# Patient Record
Sex: FEMALE | Race: WHITE | Employment: OTHER | URBAN - METROPOLITAN AREA
[De-identification: names, ages, dates, MRNs, and addresses within clinical notes are randomized per-mention and may not be internally consistent; named-entity substitution may affect disease eponyms.]

---

## 2017-06-16 ENCOUNTER — HOSPITAL ENCOUNTER (OUTPATIENT)
Dept: ENDOSCOPY | Age: 82
Discharge: HOME | End: 2017-06-16
Attending: INTERNAL MEDICINE | Admitting: INTERNAL MEDICINE

## 2017-09-14 ENCOUNTER — HOSPITAL ENCOUNTER (OUTPATIENT)
Dept: ENDOSCOPY | Age: 82
Discharge: HOME | End: 2017-09-14
Attending: INTERNAL MEDICINE | Admitting: INTERNAL MEDICINE

## 2017-09-14 RX ORDER — SODIUM CHLORIDE 9 MG/ML
INJECTION, SOLUTION INTRAVENOUS CONTINUOUS
Status: DISCONTINUED | OUTPATIENT
Start: 2017-09-14 | End: 2017-09-15 | Stop reason: HOSPADM

## 2017-09-14 RX ORDER — SODIUM CHLORIDE 0.9 % (FLUSH) 0.9 %
10 SYRINGE (ML) INJECTION EVERY 12 HOURS SCHEDULED
Status: DISCONTINUED | OUTPATIENT
Start: 2017-09-14 | End: 2017-09-15 | Stop reason: HOSPADM

## 2017-09-14 RX ORDER — SODIUM CHLORIDE 0.9 % (FLUSH) 0.9 %
10 SYRINGE (ML) INJECTION PRN
Status: DISCONTINUED | OUTPATIENT
Start: 2017-09-14 | End: 2017-09-15 | Stop reason: HOSPADM

## 2017-09-14 ASSESSMENT — ENCOUNTER SYMPTOMS: SHORTNESS OF BREATH: 0

## 2019-10-07 ENCOUNTER — HOSPITAL ENCOUNTER (OUTPATIENT)
Dept: GENERAL RADIOLOGY | Age: 84
Discharge: HOME | End: 2019-10-07
Payer: MEDICARE

## 2019-10-07 DIAGNOSIS — R13.10 DYSPHAGIA, UNSPECIFIED TYPE: ICD-10-CM

## 2019-10-07 PROCEDURE — 74240 X-RAY XM UPR GI TRC 1CNTRST: CPT

## 2019-11-13 RX ORDER — GABAPENTIN 400 MG/1
400 CAPSULE ORAL 3 TIMES DAILY
COMMUNITY
End: 2021-08-11

## 2019-11-13 RX ORDER — PRAVASTATIN SODIUM 10 MG
10 TABLET ORAL DAILY
COMMUNITY

## 2019-11-13 RX ORDER — FLECAINIDE ACETATE 50 MG/1
50 TABLET ORAL 2 TIMES DAILY
COMMUNITY

## 2019-11-13 RX ORDER — OXYBUTYNIN CHLORIDE 5 MG/1
5 TABLET ORAL 2 TIMES DAILY
COMMUNITY
End: 2021-08-11

## 2019-11-14 ENCOUNTER — ANESTHESIA EVENT (OUTPATIENT)
Dept: ENDOSCOPY | Age: 84
End: 2019-11-14
Payer: MEDICARE

## 2019-11-22 ENCOUNTER — HOSPITAL ENCOUNTER (OUTPATIENT)
Age: 84
Setting detail: OUTPATIENT SURGERY
Discharge: HOME | End: 2019-11-22
Attending: INTERNAL MEDICINE | Admitting: INTERNAL MEDICINE
Payer: MEDICARE

## 2019-11-22 ENCOUNTER — ANESTHESIA (OUTPATIENT)
Dept: ENDOSCOPY | Age: 84
End: 2019-11-22
Payer: MEDICARE

## 2019-11-22 VITALS
BODY MASS INDEX: 30.21 KG/M2 | TEMPERATURE: 97.5 F | WEIGHT: 160 LBS | DIASTOLIC BLOOD PRESSURE: 68 MMHG | HEART RATE: 59 BPM | HEIGHT: 61 IN | RESPIRATION RATE: 16 BRPM | SYSTOLIC BLOOD PRESSURE: 184 MMHG | OXYGEN SATURATION: 98 %

## 2019-11-22 VITALS
SYSTOLIC BLOOD PRESSURE: 201 MMHG | RESPIRATION RATE: 13 BRPM | DIASTOLIC BLOOD PRESSURE: 55 MMHG | OXYGEN SATURATION: 99 %

## 2019-11-22 PROCEDURE — 7100000010 HC PHASE II RECOVERY - FIRST 15 MIN: Performed by: INTERNAL MEDICINE

## 2019-11-22 PROCEDURE — 2580000003 HC RX 258: Performed by: NURSE ANESTHETIST, CERTIFIED REGISTERED

## 2019-11-22 PROCEDURE — 88305 TISSUE EXAM BY PATHOLOGIST: CPT

## 2019-11-22 PROCEDURE — 7100000011 HC PHASE II RECOVERY - ADDTL 15 MIN: Performed by: INTERNAL MEDICINE

## 2019-11-22 PROCEDURE — 2709999900 HC NON-CHARGEABLE SUPPLY: Performed by: INTERNAL MEDICINE

## 2019-11-22 PROCEDURE — 3700000000 HC ANESTHESIA ATTENDED CARE: Performed by: INTERNAL MEDICINE

## 2019-11-22 PROCEDURE — 3609012400 HC EGD TRANSORAL BIOPSY SINGLE/MULTIPLE: Performed by: INTERNAL MEDICINE

## 2019-11-22 PROCEDURE — 2500000003 HC RX 250 WO HCPCS: Performed by: NURSE ANESTHETIST, CERTIFIED REGISTERED

## 2019-11-22 PROCEDURE — 2580000003 HC RX 258: Performed by: ANESTHESIOLOGY

## 2019-11-22 PROCEDURE — 6360000002 HC RX W HCPCS: Performed by: NURSE ANESTHETIST, CERTIFIED REGISTERED

## 2019-11-22 RX ORDER — SODIUM CHLORIDE 0.9 % (FLUSH) 0.9 %
10 SYRINGE (ML) INJECTION EVERY 12 HOURS SCHEDULED
Status: DISCONTINUED | OUTPATIENT
Start: 2019-11-22 | End: 2019-11-22 | Stop reason: HOSPADM

## 2019-11-22 RX ORDER — LIDOCAINE HYDROCHLORIDE 20 MG/ML
INJECTION, SOLUTION EPIDURAL; INFILTRATION; INTRACAUDAL; PERINEURAL PRN
Status: DISCONTINUED | OUTPATIENT
Start: 2019-11-22 | End: 2019-11-22 | Stop reason: SDUPTHER

## 2019-11-22 RX ORDER — SODIUM CHLORIDE 9 MG/ML
INJECTION, SOLUTION INTRAVENOUS CONTINUOUS PRN
Status: DISCONTINUED | OUTPATIENT
Start: 2019-11-22 | End: 2019-11-22 | Stop reason: SDUPTHER

## 2019-11-22 RX ORDER — SODIUM CHLORIDE 0.9 % (FLUSH) 0.9 %
10 SYRINGE (ML) INJECTION PRN
Status: DISCONTINUED | OUTPATIENT
Start: 2019-11-22 | End: 2019-11-22 | Stop reason: HOSPADM

## 2019-11-22 RX ORDER — SODIUM CHLORIDE 9 MG/ML
INJECTION, SOLUTION INTRAVENOUS CONTINUOUS
Status: DISCONTINUED | OUTPATIENT
Start: 2019-11-22 | End: 2019-11-22 | Stop reason: HOSPADM

## 2019-11-22 RX ORDER — PROPOFOL 10 MG/ML
INJECTION, EMULSION INTRAVENOUS PRN
Status: DISCONTINUED | OUTPATIENT
Start: 2019-11-22 | End: 2019-11-22 | Stop reason: SDUPTHER

## 2019-11-22 RX ORDER — LIDOCAINE HYDROCHLORIDE 10 MG/ML
1 INJECTION, SOLUTION EPIDURAL; INFILTRATION; INTRACAUDAL; PERINEURAL
Status: DISCONTINUED | OUTPATIENT
Start: 2019-11-22 | End: 2019-11-22 | Stop reason: HOSPADM

## 2019-11-22 RX ADMIN — LIDOCAINE HYDROCHLORIDE 100 MG: 20 INJECTION, SOLUTION EPIDURAL; INFILTRATION; INTRACAUDAL; PERINEURAL at 12:04

## 2019-11-22 RX ADMIN — PROPOFOL 50 MG: 10 INJECTION, EMULSION INTRAVENOUS at 12:04

## 2019-11-22 RX ADMIN — SODIUM CHLORIDE: 9 INJECTION, SOLUTION INTRAVENOUS at 10:44

## 2019-11-22 RX ADMIN — PROPOFOL 20 MG: 10 INJECTION, EMULSION INTRAVENOUS at 12:08

## 2019-11-22 RX ADMIN — SODIUM CHLORIDE: 9 INJECTION, SOLUTION INTRAVENOUS at 11:52

## 2019-11-22 RX ADMIN — PROPOFOL 20 MG: 10 INJECTION, EMULSION INTRAVENOUS at 12:06

## 2019-11-22 RX ADMIN — PROPOFOL 10 MG: 10 INJECTION, EMULSION INTRAVENOUS at 12:10

## 2019-11-22 ASSESSMENT — ENCOUNTER SYMPTOMS: SHORTNESS OF BREATH: 0

## 2019-11-22 ASSESSMENT — PAIN - FUNCTIONAL ASSESSMENT: PAIN_FUNCTIONAL_ASSESSMENT: 0-10

## 2019-11-22 ASSESSMENT — PAIN SCALES - GENERAL
PAINLEVEL_OUTOF10: 0
PAINLEVEL_OUTOF10: 0
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
## 2021-08-10 PROBLEM — R80.9 PROTEINURIA: Status: ACTIVE | Noted: 2020-04-22

## 2021-08-10 PROBLEM — N18.30 CKD (CHRONIC KIDNEY DISEASE) STAGE 3, GFR 30-59 ML/MIN (HCC): Status: ACTIVE | Noted: 2020-01-09

## 2021-08-10 PROBLEM — I44.0 FIRST DEGREE AV BLOCK: Status: ACTIVE | Noted: 2017-05-11

## 2021-08-10 PROBLEM — M79.2 NEUROPATHIC PAIN: Status: ACTIVE | Noted: 2019-10-27

## 2021-08-10 PROBLEM — I70.213 ATHEROSCLER OF NATIVE ARTERY OF BOTH LEGS WITH INTERMIT CLAUDICATION (HCC): Status: ACTIVE | Noted: 2021-08-10

## 2021-08-10 PROBLEM — I48.0 PAROXYSMAL ATRIAL FIBRILLATION (HCC): Status: ACTIVE | Noted: 2017-05-11

## 2021-08-10 PROBLEM — Z79.899 ENCOUNTER FOR MONITORING FLECAINIDE THERAPY: Status: ACTIVE | Noted: 2017-09-29

## 2021-08-10 PROBLEM — I73.9 PERIPHERAL ARTERIAL DISEASE (HCC): Status: ACTIVE | Noted: 2020-04-22

## 2021-08-10 PROBLEM — Z51.81 ENCOUNTER FOR MONITORING FLECAINIDE THERAPY: Status: ACTIVE | Noted: 2017-09-29

## 2021-08-10 PROBLEM — L59.8 RADIATION-INDUCED FIBROSIS OF SOFT TISSUE FROM THERAPEUTIC PROCEDURE: Status: ACTIVE | Noted: 2021-08-10

## 2021-08-10 PROBLEM — D64.9 NORMOCYTIC ANEMIA: Status: ACTIVE | Noted: 2017-05-11

## 2021-08-10 PROBLEM — R10.2 PERINEAL PAIN: Status: ACTIVE | Noted: 2019-10-27

## 2021-08-10 PROBLEM — G56.03 BILATERAL CARPAL TUNNEL SYNDROME: Status: ACTIVE | Noted: 2018-08-30

## 2021-08-10 PROBLEM — E78.5 HYPERLIPIDEMIA: Status: ACTIVE | Noted: 2020-04-22

## 2021-08-10 PROBLEM — T66.XXXS RADIATION EFFECT, SEQUELA: Status: ACTIVE | Noted: 2021-08-10

## 2021-08-10 PROBLEM — N39.41 URGE INCONTINENCE OF URINE: Status: ACTIVE | Noted: 2018-04-23

## 2021-08-10 PROBLEM — G89.4 CHRONIC PAIN DISORDER: Status: ACTIVE | Noted: 2019-10-27

## 2021-08-11 ENCOUNTER — OFFICE VISIT (OUTPATIENT)
Dept: UROGYNECOLOGY | Age: 86
End: 2021-08-11
Payer: MEDICARE

## 2021-08-11 VITALS
OXYGEN SATURATION: 98 % | TEMPERATURE: 98.3 F | HEART RATE: 71 BPM | DIASTOLIC BLOOD PRESSURE: 71 MMHG | RESPIRATION RATE: 14 BRPM | SYSTOLIC BLOOD PRESSURE: 157 MMHG

## 2021-08-11 DIAGNOSIS — C52 SQUAMOUS CELL CARCINOMA OF VAGINA (HCC): ICD-10-CM

## 2021-08-11 DIAGNOSIS — R35.0 URINARY FREQUENCY: ICD-10-CM

## 2021-08-11 DIAGNOSIS — R32 URINARY INCONTINENCE, UNSPECIFIED TYPE: Primary | ICD-10-CM

## 2021-08-11 DIAGNOSIS — N32.81 OAB (OVERACTIVE BLADDER): ICD-10-CM

## 2021-08-11 DIAGNOSIS — N30.40 RADIATION CYSTITIS: ICD-10-CM

## 2021-08-11 PROCEDURE — 95971 ALYS SMPL SP/PN NPGT W/PRGRM: CPT | Performed by: NURSE PRACTITIONER

## 2021-08-11 PROCEDURE — 99214 OFFICE O/P EST MOD 30 MIN: CPT | Performed by: NURSE PRACTITIONER

## 2021-08-11 RX ORDER — LATANOPROST 50 UG/ML
1 SOLUTION/ DROPS OPHTHALMIC NIGHTLY
COMMUNITY

## 2021-08-11 RX ORDER — BENAZEPRIL HYDROCHLORIDE 40 MG/1
40 TABLET, FILM COATED ORAL DAILY
COMMUNITY

## 2021-08-11 RX ORDER — LABETALOL 100 MG/1
100 TABLET, FILM COATED ORAL 2 TIMES DAILY
COMMUNITY

## 2021-08-11 NOTE — PROGRESS NOTES
2021      HPI:     Name: Jesusita Null  YOB: 1934    CC: Patient is a 80 y.o. female who is seen in consultation from Arabella Almanzar MD   for evaluation of voiding dysfunction and interstim adjustment. HPI: Previous patient at [facility] for urinary frequency and urge incontinence. Kaylin Bailon had Interstim placed 2018. She has history of vaginal cancer with radiation 2015  She is not a Botox candidate due to this. She is unable to use vaginal estrogen cream    Previous medications include Myrbetriq, Vesicare, tolterodine and oxybutynin. None with excellent effectiveness but recalls Vesicare seemed to be most beneficial for her incontinence and frequency. She did stop the vesicare in the past few months to see if \"it was really doing anything\"  She is unable to take Myrbetriq secondary to a cardiac medication. She last had analysis and adjustment 21 at Dr. Giselle Rivero office by Christi Fallon. She desires to transfer her care here. Her h/o vulvar cancer causes her much irritation and with wetness from leakage this \"never seems to get better\". She is using instant ocean sea salt sitz bath and is working with her GynOnc and pain management for this discouraging complication     Bladder control problem: yes, 5 years, she has sudden urges, and feels as though she is always wet. Goes 5 time daily, 3 times at night. She had an interstim placed by Dr. Giselle Rivero and she is here for an adjustment today. Bladder emptying problems: no  Prolapse/Vaginal Support problems: no  Bowel problem(s): no  Sexual History:  has no history on file for sexual activity. Pelvic Pain:  yes, vaginal pain after radiation. Which is constant.    Ob/Gyn History:    OB History    Para Term  AB Living   5 5 5     5   SAB TAB Ectopic Molar Multiple Live Births             5      # Outcome Date GA Lbr Alexander/2nd Weight Sex Delivery Anes PTL Lv   5 Term      Vag-Spont   KIKE   4 Term Vag-Spont   KIKE   3 Term      Vag-Spont   KIKE   2 Term      Vag-Spont   KIKE   1 Term      Vag-Spont   KIKE     Past Medical History:   Past Medical History:   Diagnosis Date    At[facility] [address] [address]     Cancer ([facility] [address]     vaginal w/radiation and chemo    Hyperlipidemia     Hypertension     PONV (postoperative nausea and vomiting)      Past Surgical History:   Past Surgical History:   Procedure Laterality Date    APPENDECTOMY      BLADDER SURGERY      bladder stimulator    BREAST REDUCTION SURGERY  1988    FOOT SURGERY  2011    left foot    HYSTERECTOMY      OTHER SURGICAL HISTORY      watchman device    SHOULDER ARTHROSCOPY      bilateral    TONSILLECTOMY      UPPER GASTROINTESTINAL ENDOSCOPY N/A 11/22/2019    EGD BIOPSY performed by Dorothea Friedman MD at [address]     Allergies: Allergies   Allergen Reactions    Hydrocodone Itching     Itching, nausea    Atorvastatin      Other reaction(s): Muscle Aches     Current Medications:  Current Outpatient Medications   Medication Sig Dispense Refill    benazepril (LOTENSIN) 40 MG tablet Take 40 mg by mouth daily      labetalol (NORMODYNE) 100 MG tablet Take 100 mg by mouth 2 times daily      latanoprost (XALATAN) 0.005 % ophthalmic solution 1 drop nightly      flecainide (TAMBOCOR) 50 MG tablet Take 50 mg by mouth 2 times daily      pravastatin (PRAVACHOL) 10 MG tablet Take 10 mg by mouth daily      aspirin 325 MG tablet Take 325 mg by mouth daily       vitamin E 400 UNIT capsule Take 400 Units by mouth 3 times daily       Pentoxifylline (TRENTAL PO) Take 400 mg by mouth three times daily       triamterene-hydrochlorothiazide (MAXZIDE-25) 37.5-25 MG per tablet Take 1 tablet by mouth daily.  Probiotic Product (PROBIOTIC PO) Take  by mouth. No current facility-administered medications for this visit.      Social History:   Social History     Socioeconomic History    Marital status:      Spouse name: Not on file    Number of children: Not on file    Years of education: Not on file    Highest education level: Not on file   Occupational History    Not on file   Tobacco Use    Smoking status: Never Smoker    Smokeless tobacco: Never Used   Substance and Sexual Activity    Alcohol use: Yes     Comment: occassionally    Drug use: Never    Sexual activity: Not on file   Other Topics Concern    Not on file   Social History Narrative    Not on file     Social Determinants of Health     Financial Resource Strain:     Difficulty of Paying Living Expenses:    Food Insecurity:     Worried About Running Out of Food in the Last Year:     [address] in the Last Year:    Transportation Needs:     Lack of Transportation (Medical):  Lack of Transportation (Non-Medical):    Physical Activity:     Days of Exercise per Week:     Minutes of Exercise per Session:    Stress:     Feeling of Stress :    Social Connections:     Frequency of Communication with Friends and Family:     Frequency of Social Gatherings with Friends and Family:     Attends Christianity Services:     Active Member of Clubs or Organizations:     Attends Club or Organization Meetings:     Marital Status:    Intimate Partner Violence:     Fear of Current or Ex-Partner:     Emotionally Abused:     Physically Abused:     Sexually Abused:      Family History:   Family History   Problem Relation Age of Onset    Cancer Mother     Cancer Father      Review of System  Review of Systems   HENT: Positive for hearing loss. Endocrine: Positive for polyuria. Allergic/Immunologic: Positive for environmental allergies. Neurological: Positive for numbness and headaches. Hematological: Bruises/bleeds easily. All other systems reviewed and are negative. A review of systems was done by the patient and reviewed by me and scanned into media today.     Objective:     Vital Signs  Vitals:    08/11/21 1037   BP: (!) 157/71   Pulse: 71   Resp: 14   Temp: continue to work with her GynOnc regarding this due to her h/o vaginal cancer. She is hopefull that this will also benefit from better bladder and leakage control. 35 mins spent with patient on review, diagnosis and management and teaching  I have asked her to return for follow up in 4 weeks. At that time we may consider another trial of Vesicare for dual therapy. Willie Coronel, TRESSA - CNP        No orders of the defined types were placed in this encounter. No orders of the defined types were placed in this encounter.       Willie Coronel, APRN - CNP

## 2021-09-03 PROBLEM — M54.42 CHRONIC BILATERAL LOW BACK PAIN WITH LEFT-SIDED SCIATICA: Status: ACTIVE | Noted: 2021-07-28

## 2021-09-03 PROBLEM — G89.29 CHRONIC BILATERAL LOW BACK PAIN WITH LEFT-SIDED SCIATICA: Status: ACTIVE | Noted: 2021-07-28

## 2021-09-08 ENCOUNTER — OFFICE VISIT (OUTPATIENT)
Dept: UROGYNECOLOGY | Age: 86
End: 2021-09-08
Payer: MEDICARE

## 2021-09-08 VITALS
RESPIRATION RATE: 14 BRPM | SYSTOLIC BLOOD PRESSURE: 142 MMHG | DIASTOLIC BLOOD PRESSURE: 62 MMHG | OXYGEN SATURATION: 98 % | TEMPERATURE: 98.4 F | HEART RATE: 71 BPM

## 2021-09-08 DIAGNOSIS — R35.0 URINARY FREQUENCY: ICD-10-CM

## 2021-09-08 DIAGNOSIS — N95.2 VAGINAL ATROPHY: ICD-10-CM

## 2021-09-08 DIAGNOSIS — R35.1 NOCTURIA: ICD-10-CM

## 2021-09-08 DIAGNOSIS — R39.15 URGENCY OF URINATION: Primary | ICD-10-CM

## 2021-09-08 DIAGNOSIS — R10.2 VULVAR PAIN: ICD-10-CM

## 2021-09-08 PROCEDURE — 99213 OFFICE O/P EST LOW 20 MIN: CPT | Performed by: NURSE PRACTITIONER

## 2021-09-08 RX ORDER — DULOXETIN HYDROCHLORIDE 30 MG/1
30 CAPSULE, DELAYED RELEASE ORAL DAILY
COMMUNITY

## 2021-09-08 NOTE — PROGRESS NOTES
2021       HPI:     Name: Ayla Arreola  YOB: 1934    CC: Patient is a 80 y.o. presenting for evaluation of urge incontinence  and 4 week interstim check. HPI: How long have you had this problem? Several years  Please rate the severity of your problem:   Anything make it better? Current program:  Impedance and battery check performed on interstim device. Device was on functioning at Program #6 amplitude set at 0.6. Impedance indicated all electrodes functional. Battery status of 45-79 months. Decision was made to reprogram - to Program #5, amplitude set at 1.1. Patient felt comfortable stimulation in the bicycle seat area. Working much better than it was. She is doing well she is able to get to the bathroom without having any accidents. Her vagina is still very sore. She has seen her PCP and Gyn Onc regarding her painful vulva secondary to her h/o radiation for vulvar cancer. She states the outside burning seems to be improving with decreased leakage but the inside is still so bothersome. She asks today if it would be safe to use aloe vera for this.      Ob/Gyn History:    OB History    Para Term  AB Living   5 5 5     5   SAB TAB Ectopic Molar Multiple Live Births             5      # Outcome Date GA Lbr Alexander/2nd Weight Sex Delivery Anes PTL Lv   5 Term      Vag-Spont   KIKE   4 Term      Vag-Spont   KIKE   3 Term      Vag-Spont   KIKE   2 Term      Vag-Spont   KIKE   1 Term      Vag-Spont   KIKE     Past Medical History:   Past Medical History:   Diagnosis Date    Atrial fibrillation ([address]     Cancer ([address]     vaginal w/radiation and chemo    Hyperlipidemia     Hypertension     PONV (postoperative nausea and vomiting)      Past Surgical History:   Past Surgical History:   Procedure Laterality Date    APPENDECTOMY      BLADDER SURGERY      bladder stimulator    BREAST REDUCTION SURGERY  1988    FOOT SURGERY  2011    left foot    HYSTERECTOMY      OTHER Resource Strain:     Difficulty of Paying Living Expenses:    Food Insecurity:     Worried About Running Out of Food in the Last Year:     [address] in the Last Year:    Transportation Needs:     Lack of Transportation (Medical):  Lack of Transportation (Non-Medical):    Physical Activity:     Days of Exercise per Week:     Minutes of Exercise per Session:    Stress:     Feeling of Stress :    Social Connections:     Frequency of Communication with Friends and Family:     Frequency of Social Gatherings with Friends and Family:     Attends Mormonism Services:     Active Member of Clubs or Organizations:     Attends Club or Organization Meetings:     Marital Status:    Intimate Partner Violence:     Fear of Current or Ex-Partner:     Emotionally Abused:     Physically Abused:     Sexually Abused:      Family History:   Family History   Problem Relation Age of Onset    Cancer Mother     Cancer Father      Review of System   Review of Systems   Constitutional: Positive for appetite change and fatigue. Endocrine: Positive for polyuria. Genitourinary: Positive for vaginal pain. All other systems reviewed and are negative. A review of systems was done by the patient and reviewed by me and scanned into media today. Objective:     Vitals  Vitals:    09/08/21 1329   BP: (!) 142/62   Pulse: 71   Resp: 14   Temp: 98.4 °F (36.9 °C)   SpO2: 98%     Physical Exam  Physical Exam  Vitals and nursing note reviewed. Constitutional:       Appearance: Normal appearance. HENT:      Head: Normocephalic. Eyes:      Conjunctiva/sclera: Conjunctivae normal.   Cardiovascular:      Rate and Rhythm: Normal rate. Pulmonary:      Effort: Pulmonary effort is normal.   Musculoskeletal:         General: Normal range of motion. Cervical back: Normal range of motion. Skin:     General: Skin is warm and dry. Neurological:      General: No focal deficit present.       Mental Status: She is alert and oriented to person, place, and time. Psychiatric:         Mood and Affect: Mood normal.         Behavior: Behavior normal.         Thought Content: Thought content normal.         No results found for this visit on 09/08/21. Assessment/Plan:     Kalee Fitzgerald is a 80 y.o. female with   1. Urgency of urination    2. Urinary frequency    3. Nocturia    4. Vaginal atrophy    5. Vulvar pain      -U/F: patient reports good improvement in her urgency/frequency and incontinence with previous interstim reprogramming 4 weeks ago. She notes especially improvement at night  We did discuss adding dual therapy with Vesicare but she declines at this time as she is doing ok    -Vulvar pain:  She remains most bothered by her vulvar/vaginal burning which is attributed to vulvar cancer treatment and atrophy. She asks if it is safe to use aloe vera in the area and I assured her it is, as well it is ok to use her previously prescribed lidocaine. She has been referred to Pain management for her vulvar pain and is anxious to get to this appointment. She will return in 6 months or sooner if her U/F seems to worsen. TRESSA Ho - CNP    No orders of the defined types were placed in this encounter. No orders of the defined types were placed in this encounter.       TRESSA Ho - CNP

## 2022-02-09 ENCOUNTER — PATIENT MESSAGE (OUTPATIENT)
Dept: UROGYNECOLOGY | Age: 87
End: 2022-02-09

## 2022-02-09 RX ORDER — SOLIFENACIN SUCCINATE 5 MG/1
5 TABLET, FILM COATED ORAL DAILY
Qty: 30 TABLET | Refills: 2 | Status: SHIPPED | OUTPATIENT
Start: 2022-02-09 | End: 2022-02-09

## 2022-02-09 RX ORDER — SOLIFENACIN SUCCINATE 5 MG/1
5 TABLET, FILM COATED ORAL DAILY
Qty: 90 TABLET | Refills: 0 | Status: SHIPPED | OUTPATIENT
Start: 2022-02-09 | End: 2022-06-28 | Stop reason: SDUPTHER

## 2022-02-09 NOTE — TELEPHONE ENCOUNTER
Roddy Russell know we discussed this in the past and therefore I am happy to send in the [address] However, I want to ask if you have tried increasing your amps on your current interstim program as well to see if that helps? I will send in the [address] but don't hesitate to trial a change in your settings or see me for a change.   Love Hwang

## 2022-03-08 NOTE — PROGRESS NOTES
3/9/2022       HPI:     Name: Sue Booth  YOB: 1934    CC: Patient is a 80 y.o. presenting for evaluation of interstim check. HPI: How long have you had this problem? years  Please rate the severity of your problem: moderate  Anything make it better? Patient is not sure if the interstim is working or not. She did begin taking Vesicare for dual therapy about 1 month ago as she felt like she was not getting relief from her Interstim. She reports increasing the amplitudes a few times but never \"felt anything\"     Ob/Gyn History:    OB History    Para Term  AB Living   5 5 5     5   SAB IAB Ectopic Molar Multiple Live Births             5      # Outcome Date GA Lbr Alexander/2nd Weight Sex Delivery Anes PTL Lv   5 Term      Vag-Spont   KIKE   4 Term      Vag-Spont   KIKE   3 Term      Vag-Spont   KIKE   2 Term      Vag-Spont   KIKE   1 Term      Vag-Spont   KIKE     Past Medical History:   Past Medical History:   Diagnosis Date    Atrial fibrillation ([address]     Cancer ([address]     vaginal w/radiation and chemo    Hyperlipidemia     Hypertension     PONV (postoperative nausea and vomiting)      Past Surgical History:   Past Surgical History:   Procedure Laterality Date    APPENDECTOMY      BLADDER SURGERY      bladder stimulator    BREAST REDUCTION SURGERY  1988    FOOT SURGERY  2011    left foot    HYSTERECTOMY      OTHER SURGICAL HISTORY      watchman device    SHOULDER ARTHROSCOPY      bilateral    TONSILLECTOMY      UPPER GASTROINTESTINAL ENDOSCOPY N/A 2019    EGD BIOPSY performed by Sharri Jeff MD at [facility]     Allergies:    Allergies   Allergen Reactions    Hydrocodone Itching and Nausea Only     Itching, nausea    Atorvastatin      Other reaction(s): Muscle Aches  Other reaction(s): Muscle Aches     Current Medications:  Current Outpatient Medications   Medication Sig Dispense Refill    aspirin 81 MG chewable tablet Take 81 mg by mouth daily      amLODIPine (NORVASC) 5 MG tablet Take 5 mg by mouth daily      dorzolamide (TRUSOPT) 2 % ophthalmic solution INSTILL 1 DROP IN BOTH EYES TWICE DAILY      lidocaine-prilocaine (EMLA) 2.5-2.5 % cream APPLY TOPICALLY TO THE OUTER AREA AROUND THE VAGINA FOUR TIMES A DAY AS NEEDED FOR PAIN.  lidocaine-prilocaine (EMLA) 2.5-2.5 % cream       zolpidem (AMBIEN) 10 MG tablet Take 10 mg by mouth as needed.  solifenacin (VESICARE) 5 MG tablet TAKE 1 TABLET BY MOUTH DAILY 90 tablet 0    DULoxetine (CYMBALTA) 30 MG extended release capsule Take 30 mg by mouth daily      benazepril (LOTENSIN) 40 MG tablet Take 40 mg by mouth daily      labetalol (NORMODYNE) 100 MG tablet Take 100 mg by mouth 2 times daily      latanoprost (XALATAN) 0.005 % ophthalmic solution 1 drop nightly      flecainide (TAMBOCOR) 50 MG tablet Take 50 mg by mouth 2 times daily      vitamin E 400 UNIT capsule Take 400 Units by mouth 3 times daily       Pentoxifylline (TRENTAL PO) Take 400 mg by mouth three times daily       triamterene-hydrochlorothiazide (MAXZIDE-25) 37.5-25 MG per tablet Take 1 tablet by mouth daily.  Probiotic Product (PROBIOTIC PO) Take  by mouth.  pravastatin (PRAVACHOL) 10 MG tablet Take 10 mg by mouth daily (Patient not taking: Reported on 3/9/2022)      aspirin 325 MG tablet Take 325 mg by mouth daily  (Patient not taking: Reported on 3/9/2022)       No current facility-administered medications for this visit.      Social History:   Social History     Socioeconomic History    Marital status:      Spouse name: Not on file    Number of children: Not on file    Years of education: Not on file    Highest education level: Not on file   Occupational History    Not on file   Tobacco Use    Smoking status: Never Smoker    Smokeless tobacco: Never Used   Substance and Sexual Activity    Alcohol use: Yes     Comment: occassionally    Drug use: Never    Sexual activity: Not on file   Other Topics Concern    Not on file   Social History Narrative    Not on file     Social Determinants of Health     Financial Resource Strain:     Difficulty of Paying Living Expenses: Not on file   Food Insecurity:     Worried About Running Out of Food in the Last Year: Not on file    Gretchen of Food in the Last Year: Not on file   Transportation Needs:     Lack of Transportation (Medical): Not on file    Lack of Transportation (Non-Medical): Not on file   Physical Activity:     Days of Exercise per Week: Not on file    Minutes of Exercise per Session: Not on file   Stress:     Feeling of Stress : Not on file   Social Connections:     Frequency of Communication with Friends and Family: Not on file    Frequency of Social Gatherings with Friends and Family: Not on file    Attends Gnosticist Services: Not on file    Active Member of [address] [place] [address] [employer] or Organizations: Not on file    Attends Club or Organization Meetings: Not on file    Marital Status: Not on file   Intimate Partner Violence:     Fear of Current or Ex-Partner: Not on file    Emotionally Abused: Not on file    Physically Abused: Not on file    Sexually Abused: Not on file   Housing Stability:     Unable to Pay for Housing in the Last Year: Not on file    Number of [place] in the Last Year: Not on file    Unstable Housing in the Last Year: Not on file     Family History:   Family History   Problem Relation Age of Onset    Cancer Mother     Cancer Father      Review of System   Review of Systems   All other systems reviewed and are negative. A review of systems was done by the patient and reviewed by me. Objective:     Vitals  Vitals:    03/09/22 1131   BP: 135/75   Pulse: 67   Resp: 16   Temp: 98.2 °F (36.8 °C)   SpO2: 98%     Physical Exam  Physical Exam  Vitals and nursing note reviewed. Constitutional:       Appearance: Normal appearance. HENT:      Head: Normocephalic.    Eyes:      Conjunctiva/sclera: Conjunctivae normal. Cardiovascular:      Rate and Rhythm: Normal rate. Pulmonary:      Effort: Pulmonary effort is normal.   Musculoskeletal:         General: Normal range of motion. Cervical back: Normal range of motion. Skin:     General: Skin is warm and dry. Neurological:      General: No focal deficit present. Mental Status: She is alert. Psychiatric:         Mood and Affect: Mood normal.         Behavior: Behavior normal.         No results found for this visit on 03/09/22. Impedance and battery check performed on interstim device. Device was TURNED OFF. Impedance indicated one electrode not functioning. Battery status of 47-96--86 months. Decision was made to reprogram - to Program #4, amplitude set at 0.8. Patient felt comfortable stimulation in the bicycle seat area.       Assessment/Plan:     Ashley Urena is a 80 y.o. female with   1. Urgency of urination    2. Urinary frequency    3. Nocturia    4. Urinary incontinence, unspecified type      Patient presented with worsening Urgency, nocturia and incontinence. Interrogation of her Interstim revealed it was shut off. Uncertain how long this has been off. I reviewed instructions for use of the handheld with patient as it is easy to confuse the on/off buttons  We reprogrammed her devise and she will return prn  TRESSA Chong CNP      No orders of the defined types were placed in this encounter. No orders of the defined types were placed in this encounter.       TRESSA Chong CNP

## 2022-03-09 ENCOUNTER — OFFICE VISIT (OUTPATIENT)
Dept: UROGYNECOLOGY | Age: 87
End: 2022-03-09
Payer: MEDICARE

## 2022-03-09 VITALS
RESPIRATION RATE: 16 BRPM | TEMPERATURE: 98.2 F | HEART RATE: 67 BPM | DIASTOLIC BLOOD PRESSURE: 75 MMHG | SYSTOLIC BLOOD PRESSURE: 135 MMHG | OXYGEN SATURATION: 98 %

## 2022-03-09 DIAGNOSIS — R39.15 URGENCY OF URINATION: Primary | ICD-10-CM

## 2022-03-09 DIAGNOSIS — R35.1 NOCTURIA: ICD-10-CM

## 2022-03-09 DIAGNOSIS — R32 URINARY INCONTINENCE, UNSPECIFIED TYPE: ICD-10-CM

## 2022-03-09 DIAGNOSIS — R35.0 URINARY FREQUENCY: ICD-10-CM

## 2022-03-09 PROCEDURE — 99213 OFFICE O/P EST LOW 20 MIN: CPT | Performed by: NURSE PRACTITIONER

## 2022-03-09 PROCEDURE — 4040F PNEUMOC VAC/ADMIN/RCVD: CPT | Performed by: NURSE PRACTITIONER

## 2022-03-09 PROCEDURE — G8427 DOCREV CUR MEDS BY ELIG CLIN: HCPCS | Performed by: NURSE PRACTITIONER

## 2022-03-09 PROCEDURE — G8484 FLU IMMUNIZE NO ADMIN: HCPCS | Performed by: NURSE PRACTITIONER

## 2022-03-09 PROCEDURE — 0509F URINE INCON PLAN DOCD: CPT | Performed by: NURSE PRACTITIONER

## 2022-03-09 PROCEDURE — G8421 BMI NOT CALCULATED: HCPCS | Performed by: NURSE PRACTITIONER

## 2022-03-09 PROCEDURE — 1123F ACP DISCUSS/DSCN MKR DOCD: CPT | Performed by: NURSE PRACTITIONER

## 2022-03-09 PROCEDURE — 1036F TOBACCO NON-USER: CPT | Performed by: NURSE PRACTITIONER

## 2022-03-09 PROCEDURE — 95971 ALYS SMPL SP/PN NPGT W/PRGRM: CPT | Performed by: NURSE PRACTITIONER

## 2022-03-09 PROCEDURE — 1090F PRES/ABSN URINE INCON ASSESS: CPT | Performed by: NURSE PRACTITIONER

## 2022-03-09 RX ORDER — ASPIRIN 81 MG/1
81 TABLET, CHEWABLE ORAL DAILY
COMMUNITY

## 2022-03-09 RX ORDER — LIDOCAINE AND PRILOCAINE 25; 25 MG/G; MG/G
CREAM TOPICAL
COMMUNITY
Start: 2022-02-21 | End: 2022-03-31

## 2022-03-09 RX ORDER — AMLODIPINE BESYLATE 5 MG/1
5 TABLET ORAL DAILY
COMMUNITY
Start: 2021-10-18

## 2022-03-09 RX ORDER — ZOLPIDEM TARTRATE 10 MG/1
10 TABLET ORAL PRN
COMMUNITY
Start: 2021-09-09

## 2022-03-09 RX ORDER — LIDOCAINE AND PRILOCAINE 25; 25 MG/G; MG/G
CREAM TOPICAL
COMMUNITY
Start: 2021-08-09 | End: 2022-03-31

## 2022-03-09 RX ORDER — DORZOLAMIDE HCL 20 MG/ML
SOLUTION/ DROPS OPHTHALMIC
COMMUNITY
Start: 2021-10-17

## 2022-03-31 ENCOUNTER — OFFICE VISIT (OUTPATIENT)
Dept: UROGYNECOLOGY | Age: 87
End: 2022-03-31
Payer: MEDICARE

## 2022-03-31 VITALS
RESPIRATION RATE: 15 BRPM | SYSTOLIC BLOOD PRESSURE: 183 MMHG | TEMPERATURE: 98.1 F | DIASTOLIC BLOOD PRESSURE: 70 MMHG | HEART RATE: 72 BPM | OXYGEN SATURATION: 99 %

## 2022-03-31 DIAGNOSIS — R10.2 VULVAR PAIN: ICD-10-CM

## 2022-03-31 DIAGNOSIS — R32 URINARY INCONTINENCE, UNSPECIFIED TYPE: ICD-10-CM

## 2022-03-31 DIAGNOSIS — R39.15 URGENCY OF URINATION: Primary | ICD-10-CM

## 2022-03-31 DIAGNOSIS — R35.0 URINARY FREQUENCY: ICD-10-CM

## 2022-03-31 DIAGNOSIS — R35.1 NOCTURIA: ICD-10-CM

## 2022-03-31 LAB
BILIRUBIN, POC: NORMAL
BLOOD URINE, POC: NORMAL
CLARITY, POC: NORMAL
COLOR, POC: YELLOW
GLUCOSE URINE, POC: NORMAL
KETONES, POC: NORMAL
LEUKOCYTE EST, POC: NORMAL
NITRITE, POC: NORMAL
PH, POC: 6.5
PROTEIN, POC: NORMAL
SPECIFIC GRAVITY, POC: 1.01
UROBILINOGEN, POC: NORMAL

## 2022-03-31 PROCEDURE — 1036F TOBACCO NON-USER: CPT | Performed by: NURSE PRACTITIONER

## 2022-03-31 PROCEDURE — 0509F URINE INCON PLAN DOCD: CPT | Performed by: NURSE PRACTITIONER

## 2022-03-31 PROCEDURE — 1090F PRES/ABSN URINE INCON ASSESS: CPT | Performed by: NURSE PRACTITIONER

## 2022-03-31 PROCEDURE — G8421 BMI NOT CALCULATED: HCPCS | Performed by: NURSE PRACTITIONER

## 2022-03-31 PROCEDURE — 95971 ALYS SMPL SP/PN NPGT W/PRGRM: CPT | Performed by: NURSE PRACTITIONER

## 2022-03-31 PROCEDURE — 1123F ACP DISCUSS/DSCN MKR DOCD: CPT | Performed by: NURSE PRACTITIONER

## 2022-03-31 PROCEDURE — G8427 DOCREV CUR MEDS BY ELIG CLIN: HCPCS | Performed by: NURSE PRACTITIONER

## 2022-03-31 PROCEDURE — 99213 OFFICE O/P EST LOW 20 MIN: CPT | Performed by: NURSE PRACTITIONER

## 2022-03-31 PROCEDURE — 4040F PNEUMOC VAC/ADMIN/RCVD: CPT | Performed by: NURSE PRACTITIONER

## 2022-03-31 PROCEDURE — 81002 URINALYSIS NONAUTO W/O SCOPE: CPT | Performed by: NURSE PRACTITIONER

## 2022-03-31 PROCEDURE — G8484 FLU IMMUNIZE NO ADMIN: HCPCS | Performed by: NURSE PRACTITIONER

## 2022-03-31 RX ORDER — METHYLPREDNISOLONE 4 MG/1
TABLET ORAL
COMMUNITY
Start: 2022-03-29 | End: 2022-04-18 | Stop reason: ALTCHOICE

## 2022-03-31 RX ORDER — PREDNISONE 1 MG/1
TABLET ORAL
COMMUNITY
Start: 2022-03-21

## 2022-03-31 RX ORDER — PREGABALIN 50 MG/1
CAPSULE ORAL
COMMUNITY
Start: 2022-03-22

## 2022-03-31 NOTE — PROGRESS NOTES
3/31/2022       HPI:     Name: Ashley Urena  YOB: 1934    CC: Patient is a 80 y.o. presenting for evaluation of insterstim. Patient is experiencing increases urinary frequency and urgency. She is wearing pads for urinary incontinence. HPI: How long have you had this problem? Please rate the severity of your problem: severe  Anything make it better? Patient is here to get her interstim adjusted in hopes of recovering. Last adjustment was 3/9 and she has not received benefit. She has not made any changes herself. Ob/Gyn History:    OB History    Para Term  AB Living   5 5 5     5   SAB IAB Ectopic Molar Multiple Live Births             5      # Outcome Date GA Lbr Alexander/2nd Weight Sex Delivery Anes PTL Lv   5 Term      Vag-Spont   KIKE   4 Term      Vag-Spont   KIKE   3 Term      Vag-Spont   KIKE   2 Term      Vag-Spont   KIKE   1 Term      Vag-Spont   KIKE     Past Medical History:   Past Medical History:   Diagnosis Date    Atrial fibrillation ([address]     Cancer ([address]     vaginal w/radiation and chemo    Hyperlipidemia     Hypertension     PONV (postoperative nausea and vomiting)      Past Surgical History:   Past Surgical History:   Procedure Laterality Date    APPENDECTOMY      BLADDER SURGERY      bladder stimulator    BREAST REDUCTION SURGERY  1988    FOOT SURGERY  2011    left foot    HYSTERECTOMY      OTHER SURGICAL HISTORY      watchman device    SHOULDER ARTHROSCOPY      bilateral    TONSILLECTOMY      UPPER GASTROINTESTINAL ENDOSCOPY N/A 2019    EGD BIOPSY performed by Perfecto Young MD at [address]     Allergies:    Allergies   Allergen Reactions    Hydrocodone Itching and Nausea Only     Itching, nausea    Atorvastatin      Other reaction(s): Muscle Aches  Other reaction(s): Muscle Aches     Current Medications:  Current Outpatient Medications   Medication Sig Dispense Refill    methylPREDNISolone (MEDROL DOSEPACK) 4 MG tablet       predniSONE (DELTASONE) 5 MG tablet TAKE 1 TABLET BY MOUTH DAILY      pregabalin (LYRICA) 50 MG capsule TAKE 1 CAPSULE BY MOUTH THREE TIMES DAILY      aspirin 81 MG chewable tablet Take 81 mg by mouth daily      amLODIPine (NORVASC) 5 MG tablet Take 5 mg by mouth daily      dorzolamide (TRUSOPT) 2 % ophthalmic solution INSTILL 1 DROP IN BOTH EYES TWICE DAILY      zolpidem (AMBIEN) 10 MG tablet Take 10 mg by mouth as needed.  solifenacin (VESICARE) 5 MG tablet TAKE 1 TABLET BY MOUTH DAILY 90 tablet 0    DULoxetine (CYMBALTA) 30 MG extended release capsule Take 30 mg by mouth daily      benazepril (LOTENSIN) 40 MG tablet Take 40 mg by mouth daily      labetalol (NORMODYNE) 100 MG tablet Take 100 mg by mouth 2 times daily      latanoprost (XALATAN) 0.005 % ophthalmic solution 1 drop nightly      flecainide (TAMBOCOR) 50 MG tablet Take 50 mg by mouth 2 times daily      vitamin E 400 UNIT capsule Take 400 Units by mouth 3 times daily       Pentoxifylline (TRENTAL PO) Take 400 mg by mouth three times daily       triamterene-hydrochlorothiazide (MAXZIDE-25) 37.5-25 MG per tablet Take 1 tablet by mouth daily.  Probiotic Product (PROBIOTIC PO) Take  by mouth.  pravastatin (PRAVACHOL) 10 MG tablet Take 10 mg by mouth daily  (Patient not taking: Reported on 3/31/2022)       No current facility-administered medications for this visit.      Social History:   Social History     Socioeconomic History    Marital status:      Spouse name: Not on file    Number of children: Not on file    Years of education: Not on file    Highest education level: Not on file   Occupational History    Not on file   Tobacco Use    Smoking status: Never Smoker    Smokeless tobacco: Never Used   Substance and Sexual Activity    Alcohol use: Yes     Comment: occassionally    Drug use: Never    Sexual activity: Not on file   Other Topics Concern    Not on file   Social History Narrative    Not on file Social Determinants of Health     Financial Resource Strain:     Difficulty of Paying Living Expenses: Not on file   Food Insecurity:     Worried About Running Out of Food in the Last Year: Not on file    Gretchen of Food in the Last Year: Not on file   Transportation Needs:     Lack of Transportation (Medical): Not on file    Lack of Transportation (Non-Medical): Not on file   Physical Activity:     Days of Exercise per Week: Not on file    Minutes of Exercise per Session: Not on file   Stress:     Feeling of Stress : Not on file   Social Connections:     Frequency of Communication with Friends and Family: Not on file    Frequency of Social Gatherings with Friends and Family: Not on file    Attends Denominational Services: Not on file    Active Member of [address] [place] [address] [employer] or Organizations: Not on file    Attends Club or Organization Meetings: Not on file    Marital Status: Not on file   Intimate Partner Violence:     Fear of Current or Ex-Partner: Not on file    Emotionally Abused: Not on file    Physically Abused: Not on file    Sexually Abused: Not on file   Housing Stability:     Unable to Pay for Housing in the Last Year: Not on file    Number of [place] in the Last Year: Not on file    Unstable Housing in the Last Year: Not on file     Family History:   Family History   Problem Relation Age of Onset    Cancer Mother     Cancer Father      Review of System   Review of Systems   HENT: Positive for hearing loss. Genitourinary: Positive for frequency. All other systems reviewed and are negative. A review of systems was done by the patient and reviewed by me. Objective:     Vitals  Vitals:    03/31/22 1101   BP: (!) 183/70   Pulse: 72   Resp: 15   Temp: 98.1 °F (36.7 °C)   SpO2: 99%     Physical Exam  Physical Exam  Vitals and nursing note reviewed. Constitutional:       Appearance: Normal appearance. HENT:      Head: Normocephalic.    Eyes:      Conjunctiva/sclera: Conjunctivae normal.   Cardiovascular:      Rate and Rhythm: Normal rate. Pulmonary:      Effort: Pulmonary effort is normal.   Musculoskeletal:         General: Normal range of motion. Cervical back: Normal range of motion. Skin:     General: Skin is warm and dry. Neurological:      General: No focal deficit present. Mental Status: She is alert. Psychiatric:         Mood and Affect: Mood normal.         Behavior: Behavior normal.          Impedance and battery check performed on interstim device. Device was on Program 4 at 0.8amps. Impedance indicated one electrode not functioning. Battery status of 50-89months. Decision was made to reprogram - to Program #1, amplitude set at 1.0. Patient felt comfortable stimulation in the bicycle seat area. No results found for this visit on 03/31/22. Assessment/Plan:     Yolanda Arora is a 80 y.o. female with   1. Urgency of urination    2. Urinary frequency    3. Urinary incontinence, unspecified type    4. Nocturia    5. Vulvar pain    -UUI:  Interstim was evaluated and reprogrammed. She will wait two weeks and if no better will increase amps herself. She continues taking her Vesicare. She has extinguished all other meds and is not a candidate for Botox.  -Clean catch urine sent. Vulvar pain: she did see Interventional radiologist and had nerve injection in February which has significantly reduced her vulvar pain. TRESSA Ramirez - CNP        No orders of the defined types were placed in this encounter. No orders of the defined types were placed in this encounter.       TRESSA Ramirez - CNP

## 2022-04-01 LAB — URINE CULTURE, ROUTINE: NORMAL

## 2022-04-15 ENCOUNTER — TELEPHONE (OUTPATIENT)
Dept: UROGYNECOLOGY | Age: 87
End: 2022-04-15

## 2022-04-18 ENCOUNTER — OFFICE VISIT (OUTPATIENT)
Dept: UROGYNECOLOGY | Age: 87
End: 2022-04-18
Payer: MEDICARE

## 2022-04-18 VITALS
HEART RATE: 72 BPM | DIASTOLIC BLOOD PRESSURE: 83 MMHG | OXYGEN SATURATION: 97 % | SYSTOLIC BLOOD PRESSURE: 166 MMHG | RESPIRATION RATE: 14 BRPM | TEMPERATURE: 97.8 F

## 2022-04-18 DIAGNOSIS — R39.15 URGENCY OF URINATION: Primary | ICD-10-CM

## 2022-04-18 DIAGNOSIS — R35.1 NOCTURIA: ICD-10-CM

## 2022-04-18 DIAGNOSIS — R32 URINARY INCONTINENCE, UNSPECIFIED TYPE: ICD-10-CM

## 2022-04-18 PROCEDURE — G8421 BMI NOT CALCULATED: HCPCS | Performed by: NURSE PRACTITIONER

## 2022-04-18 PROCEDURE — 95972 ALYS CPLX SP/PN NPGT W/PRGRM: CPT | Performed by: NURSE PRACTITIONER

## 2022-04-18 PROCEDURE — 99215 OFFICE O/P EST HI 40 MIN: CPT | Performed by: NURSE PRACTITIONER

## 2022-04-18 PROCEDURE — 1123F ACP DISCUSS/DSCN MKR DOCD: CPT | Performed by: NURSE PRACTITIONER

## 2022-04-18 PROCEDURE — 1036F TOBACCO NON-USER: CPT | Performed by: NURSE PRACTITIONER

## 2022-04-18 PROCEDURE — 4040F PNEUMOC VAC/ADMIN/RCVD: CPT | Performed by: NURSE PRACTITIONER

## 2022-04-18 PROCEDURE — 1090F PRES/ABSN URINE INCON ASSESS: CPT | Performed by: NURSE PRACTITIONER

## 2022-04-18 PROCEDURE — 0509F URINE INCON PLAN DOCD: CPT | Performed by: NURSE PRACTITIONER

## 2022-04-18 PROCEDURE — G8427 DOCREV CUR MEDS BY ELIG CLIN: HCPCS | Performed by: NURSE PRACTITIONER

## 2022-04-18 RX ORDER — LIDOCAINE AND PRILOCAINE 25; 25 MG/G; MG/G
CREAM TOPICAL
COMMUNITY
Start: 2022-04-14

## 2022-04-18 NOTE — PROGRESS NOTES
2022       HPI:     Name: Griselda Houston  YOB: 1934    CC: Patient is a 80 y.o. presenting for evaluation of urinary incontinence. HPI: How long have you had this problem? 7 years  Please rate the severity of your problem: severe  Anything make it better? no    3 leaks at night last night  5 leaks during day    Back to baseline leaking events. Ob/Gyn History:    OB History    Para Term  AB Living   5 5 5     5   SAB IAB Ectopic Molar Multiple Live Births             5      # Outcome Date GA Lbr Alexander/2nd Weight Sex Delivery Anes PTL Lv   5 Term      Vag-Spont   KIKE   4 Term      Vag-Spont   KIKE   3 Term      Vag-Spont   KIKE   2 Term      Vag-Spont   KIKE   1 Term      Vag-Spont   KIKE     Past Medical History:   Past Medical History:   Diagnosis Date    At[facility]     Cancer ([facility]     vaginal w/radiation and chemo    Hyperlipidemia     Hypertension     PONV (postoperative nausea and vomiting)      Past Surgical History:   Past Surgical History:   Procedure Laterality Date    APPENDECTOMY      BLADDER SURGERY      bladder stimulator    BREAST REDUCTION SURGERY      FOOT SURGERY  2011    left foot    HYSTERECTOMY      OTHER SURGICAL HISTORY      watchman device    SHOULDER ARTHROSCOPY      bilateral    TONSILLECTOMY      UPPER GASTROINTESTINAL ENDOSCOPY N/A 2019    EGD BIOPSY performed by Trace Rosen MD at [address] [place] [address]     Allergies: Allergies   Allergen Reactions    Hydrocodone Itching and Nausea Only     Itching, nausea  Itching, nausea  Itching, nausea    Atorvastatin      Other reaction(s): Muscle Aches  Other reaction(s): Muscle Aches  Other reaction(s):  Other (See Comments)  Muscle ache and fatigue  Other reaction(s): Myalgia  Other reaction(s): Muscle Aches  Muscle ache and fatigue  Other reaction(s): Muscle Aches  Other reaction(s): Muscle Aches     Current Medications:  Current Outpatient Medications   Medication Sig Dispense Refill    lidocaine-prilocaine (EMLA) 2.5-2.5 % cream       pregabalin (LYRICA) 50 MG capsule TAKE 1 CAPSULE BY MOUTH THREE TIMES DAILY      aspirin 81 MG chewable tablet Take 81 mg by mouth daily      amLODIPine (NORVASC) 5 MG tablet Take 5 mg by mouth daily      dorzolamide (TRUSOPT) 2 % ophthalmic solution INSTILL 1 DROP IN BOTH EYES TWICE DAILY      zolpidem (AMBIEN) 10 MG tablet Take 10 mg by mouth as needed.  solifenacin (VESICARE) 5 MG tablet TAKE 1 TABLET BY MOUTH DAILY 90 tablet 0    DULoxetine (CYMBALTA) 30 MG extended release capsule Take 30 mg by mouth daily      benazepril (LOTENSIN) 40 MG tablet Take 40 mg by mouth daily      labetalol (NORMODYNE) 100 MG tablet Take 100 mg by mouth 2 times daily      latanoprost (XALATAN) 0.005 % ophthalmic solution 1 drop nightly      flecainide (TAMBOCOR) 50 MG tablet Take 50 mg by mouth 2 times daily      vitamin E 400 UNIT capsule Take 400 Units by mouth 3 times daily       Pentoxifylline (TRENTAL PO) Take 400 mg by mouth three times daily       triamterene-hydrochlorothiazide (MAXZIDE-25) 37.5-25 MG per tablet Take 1 tablet by mouth daily.  Probiotic Product (PROBIOTIC PO) Take  by mouth.  methylPREDNISolone (MEDROL DOSEPACK) 4 MG tablet  (Patient not taking: Reported on 4/18/2022)      predniSONE (DELTASONE) 5 MG tablet TAKE 1 TABLET BY MOUTH DAILY (Patient not taking: Reported on 4/18/2022)      pravastatin (PRAVACHOL) 10 MG tablet Take 10 mg by mouth daily  (Patient not taking: Reported on 4/18/2022)       No current facility-administered medications for this visit.      Social History:   Social History     Socioeconomic History    Marital status:      Spouse name: Not on file    Number of children: Not on file    Years of education: Not on file    Highest education level: Not on file   Occupational History    Not on file   Tobacco Use    Smoking status: Never Smoker    Smokeless tobacco: Never Used Substance and Sexual Activity    Alcohol use: Yes     Comment: occassionally    Drug use: Never    Sexual activity: Not on file   Other Topics Concern    Not on file   Social History Narrative    Not on file     Social Determinants of Health     Financial Resource Strain:     Difficulty of Paying Living Expenses: Not on file   Food Insecurity:     Worried About Running Out of Food in the Last Year: Not on file    Gretchen of Food in the Last Year: Not on file   Transportation Needs:     Lack of Transportation (Medical): Not on file    Lack of Transportation (Non-Medical): Not on file   Physical Activity:     Days of Exercise per Week: Not on file    Minutes of Exercise per Session: Not on file   Stress:     Feeling of Stress : Not on file   Social Connections:     Frequency of Communication with Friends and Family: Not on file    Frequency of Social Gatherings with Friends and Family: Not on file    Attends Yaz[employer]ism Services: Not on file    Active Member of [address] [place] [address] or Organizations: Not on file    Attends Club or Organization Meetings: Not on file    Marital Status: Not on file   Intimate Partner Violence:     Fear of Current or Ex-Partner: Not on file    Emotionally Abused: Not on file    Physically Abused: Not on file    Sexually Abused: Not on file   Housing Stability:     Unable to Pay for Housing in the Last Year: Not on file    Number of [place] in the Last Year: Not on file    Unstable Housing in the Last Year: Not on file     Family History:   Family History   Problem Relation Age of Onset    Cancer Mother     Cancer Father      Review of System   Review of Systems   HENT: Positive for hearing loss. Genitourinary: Positive for frequency. Hematological: Bruises/bleeds easily. All other systems reviewed and are negative. A review of systems was done by the patient and reviewed by me.     Objective:     Vitals  Vitals:    04/18/22 1430   BP: (!) 166/83   Pulse: 72 Resp: 14   Temp: 97.8 °F (36.6 °C)   SpO2: 97%     Physical Exam  Physical Exam  Vitals and nursing note reviewed. Constitutional:       Appearance: Normal appearance. HENT:      Head: Normocephalic. Eyes:      Conjunctiva/sclera: Conjunctivae normal.   Cardiovascular:      Rate and Rhythm: Normal rate. Pulmonary:      Effort: Pulmonary effort is normal.   Musculoskeletal:         General: Normal range of motion. Cervical back: Normal range of motion. Skin:     General: Skin is warm and dry. Neurological:      General: No focal deficit present. Mental Status: She is alert. Psychiatric:         Mood and Affect: Mood normal.         Behavior: Behavior normal.         No results found for this visit on 04/18/22. Romana Gale, [employer] rep present for assistance. Impedance and battery check performed on iLost device. Device was on Program 4 at 1.0 amps.  Impedance indicated one electrode not functioning. Battery status of 50-89months. Decision was made to reprogram - to Program #1, amplitude set at 0.9. Patient felt comfortable stimulation in the bicycle seat area. Patient  malfunctioning and tech services called. Amplitude limitations reset. System rechecked. Assessment/Plan:     Elham Mccollum is a 80 y.o. female with   1. Urgency of urination    2. Urinary incontinence, unspecified type    3. Nocturia       -Records reviewed   Uncertain if her recent pudendal nerve blocks and chemical ablation in February have played a roll in her stimulation no longer being effective. [employer] rep will investigate this. Program change initiated as above. If this does not make any difference for her again will plan to check   2 view AP and lateral xray due to recent pudendal block and ablation. 55 mins spent with patient. Peter Ziegler, APRN - CNP      No orders of the defined types were placed in this encounter.     No orders of the defined types were placed in this encounter.       Stanley Mixon, APRN - CNP

## 2022-04-29 ENCOUNTER — TELEPHONE (OUTPATIENT)
Dept: UROGYNECOLOGY | Age: 87
End: 2022-04-29

## 2022-04-29 DIAGNOSIS — Z96.82 PRESENCE OF NEUROSTIMULATOR: Primary | ICD-10-CM

## 2022-04-29 NOTE — TELEPHONE ENCOUNTER
Pt called, requesting to speak with Douglas Eduardo. Asked pt if she would like to speak with a nurse but is requesting Douglas Eduardo. Please advise.         [phone]

## 2022-04-29 NOTE — PROGRESS NOTES
Patient phoned stating she is not getting good relief with the previous adjustment of her Interstim. She is also experiencing fecal incontinence and her GI advised her this was likely due to her radiation treatments as well and suggested follow up with us for interstim adjustment.    As we discussed at her last visit, due to her pudendal nerve block and ablation we will check placement of her leads with xray since we are no longer getting good results

## 2022-05-02 ENCOUNTER — HOSPITAL ENCOUNTER (OUTPATIENT)
Dept: GENERAL RADIOLOGY | Age: 87
Discharge: HOME | End: 2022-05-02
Payer: MEDICARE

## 2022-05-02 DIAGNOSIS — Z96.82 PRESENCE OF NEUROSTIMULATOR: ICD-10-CM

## 2022-05-02 DIAGNOSIS — Z96.82 NEUROSTIMULATOR DEVICE IN SITU: ICD-10-CM

## 2022-05-02 PROCEDURE — 72220 X-RAY EXAM SACRUM TAILBONE: CPT

## 2022-05-02 PROCEDURE — 72100 X-RAY EXAM L-S SPINE 2/3 VWS: CPT

## 2022-05-04 ENCOUNTER — TELEPHONE (OUTPATIENT)
Dept: UROGYNECOLOGY | Age: 87
End: 2022-05-04

## 2022-05-04 NOTE — TELEPHONE ENCOUNTER
Reviewed results of xray with patient which notes good placement of her Interstim. Discussed how this is no longer working for her incontinence and after review of her chart from 2016 to now, Dr. Kenna Rodriguez and I feel she may very well be a good candidate for Botox. 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   She will come in for discussion and consent with Dr. Kenna Rodriguez

## 2022-05-12 ENCOUNTER — OFFICE VISIT (OUTPATIENT)
Dept: UROGYNECOLOGY | Age: 87
End: 2022-05-12
Payer: MEDICARE

## 2022-05-12 VITALS
SYSTOLIC BLOOD PRESSURE: 186 MMHG | TEMPERATURE: 98.1 F | DIASTOLIC BLOOD PRESSURE: 81 MMHG | HEART RATE: 84 BPM | OXYGEN SATURATION: 98 % | RESPIRATION RATE: 18 BRPM

## 2022-05-12 DIAGNOSIS — R35.1 NOCTURIA: ICD-10-CM

## 2022-05-12 DIAGNOSIS — Z96.82 PRESENCE OF NEUROSTIMULATOR: ICD-10-CM

## 2022-05-12 DIAGNOSIS — R35.0 FREQUENCY OF URINATION: Primary | ICD-10-CM

## 2022-05-12 DIAGNOSIS — R35.0 URINARY FREQUENCY: ICD-10-CM

## 2022-05-12 DIAGNOSIS — C52 SQUAMOUS CELL CARCINOMA OF VAGINA (HCC): ICD-10-CM

## 2022-05-12 DIAGNOSIS — R39.15 URGENCY OF URINATION: ICD-10-CM

## 2022-05-12 PROCEDURE — 99214 OFFICE O/P EST MOD 30 MIN: CPT | Performed by: OBSTETRICS & GYNECOLOGY

## 2022-05-12 PROCEDURE — 1123F ACP DISCUSS/DSCN MKR DOCD: CPT | Performed by: OBSTETRICS & GYNECOLOGY

## 2022-05-12 PROCEDURE — 1036F TOBACCO NON-USER: CPT | Performed by: OBSTETRICS & GYNECOLOGY

## 2022-05-12 PROCEDURE — 1090F PRES/ABSN URINE INCON ASSESS: CPT | Performed by: OBSTETRICS & GYNECOLOGY

## 2022-05-12 PROCEDURE — G8421 BMI NOT CALCULATED: HCPCS | Performed by: OBSTETRICS & GYNECOLOGY

## 2022-05-12 PROCEDURE — G8427 DOCREV CUR MEDS BY ELIG CLIN: HCPCS | Performed by: OBSTETRICS & GYNECOLOGY

## 2022-05-12 PROCEDURE — 4040F PNEUMOC VAC/ADMIN/RCVD: CPT | Performed by: OBSTETRICS & GYNECOLOGY

## 2022-05-12 RX ORDER — LIDOCAINE 50 MG/G
OINTMENT TOPICAL
COMMUNITY
Start: 2022-05-03

## 2022-05-12 NOTE — PROGRESS NOTES
2022       HPI:     Name: Ayla Arreola  YOB: 1934    CC: Patient is a 80 y.o. presenting for evaluation of possible botox injections. Patient is experiencing urinary incontinence and had to wear pads everyday. HPI: How long have you had this problem? years  Please rate the severity of your problem: moderate  Anything make it better? Per patient the vesicare is not helping at this time. Ob/Gyn History:    OB History    Para Term  AB Living   5 5 5     5   SAB IAB Ectopic Molar Multiple Live Births             5      # Outcome Date GA Lbr Alexander/2nd Weight Sex Delivery Anes PTL Lv   5 Term      Vag-Spont   KIKE   4 Term      Vag-Spont   KIKE   3 Term      Vag-Spont   KIKE   2 Term      Vag-Spont   KIKE   1 Term      Vag-Spont   KIKE     Past Medical History:   Past Medical History:   Diagnosis Date    Atrial fibrillation ([address]     Cancer ([address]     vaginal w/radiation and chemo    Hyperlipidemia     Hypertension     PONV (postoperative nausea and vomiting)      Past Surgical History:   Past Surgical History:   Procedure Laterality Date    APPENDECTOMY      BLADDER SURGERY      bladder stimulator    BREAST REDUCTION SURGERY  1988    FOOT SURGERY  2011    left foot    HYSTERECTOMY      OTHER SURGICAL HISTORY      watchman device    SHOULDER ARTHROSCOPY      bilateral    TONSILLECTOMY      UPPER GASTROINTESTINAL ENDOSCOPY N/A 2019    EGD BIOPSY performed by Kaycee Son MD at [address]     Allergies: Allergies   Allergen Reactions    Hydrocodone Itching and Nausea Only     Itching, nausea  Itching, nausea  Itching, nausea    Atorvastatin      Other reaction(s): Muscle Aches  Other reaction(s): Muscle Aches  Other reaction(s):  Other (See Comments)  Muscle ache and fatigue  Other reaction(s): Myalgia  Other reaction(s): Muscle Aches  Muscle ache and fatigue  Other reaction(s): Muscle Aches  Other reaction(s): Muscle Aches     Current Medications:  Current Outpatient Medications   Medication Sig Dispense Refill    lidocaine (XYLOCAINE) 5 % ointment       lidocaine-prilocaine (EMLA) 2.5-2.5 % cream       predniSONE (DELTASONE) 5 MG tablet TAKE 1 TABLET BY MOUTH DAILY (Patient not taking: Reported on 4/18/2022)      pregabalin (LYRICA) 50 MG capsule TAKE 1 CAPSULE BY MOUTH THREE TIMES DAILY      aspirin 81 MG chewable tablet Take 81 mg by mouth daily      amLODIPine (NORVASC) 5 MG tablet Take 5 mg by mouth daily      dorzolamide (TRUSOPT) 2 % ophthalmic solution INSTILL 1 DROP IN BOTH EYES TWICE DAILY      zolpidem (AMBIEN) 10 MG tablet Take 10 mg by mouth as needed.  solifenacin (VESICARE) 5 MG tablet TAKE 1 TABLET BY MOUTH DAILY 90 tablet 0    DULoxetine (CYMBALTA) 30 MG extended release capsule Take 30 mg by mouth daily      benazepril (LOTENSIN) 40 MG tablet Take 40 mg by mouth daily      labetalol (NORMODYNE) 100 MG tablet Take 100 mg by mouth 2 times daily      latanoprost (XALATAN) 0.005 % ophthalmic solution 1 drop nightly      flecainide (TAMBOCOR) 50 MG tablet Take 50 mg by mouth 2 times daily      pravastatin (PRAVACHOL) 10 MG tablet Take 10 mg by mouth daily  (Patient not taking: Reported on 4/18/2022)      vitamin E 400 UNIT capsule Take 400 Units by mouth 3 times daily       Pentoxifylline (TRENTAL PO) Take 400 mg by mouth three times daily       triamterene-hydrochlorothiazide (MAXZIDE-25) 37.5-25 MG per tablet Take 1 tablet by mouth daily.  Probiotic Product (PROBIOTIC PO) Take  by mouth. No current facility-administered medications for this visit.      Social History:   Social History     Socioeconomic History    Marital status:      Spouse name: Not on file    Number of children: Not on file    Years of education: Not on file    Highest education level: Not on file   Occupational History    Not on file   Tobacco Use    Smoking status: Never Smoker    Smokeless tobacco: Never Used Substance and Sexual Activity    Alcohol use: Yes     Comment: occassionally    Drug use: Never    Sexual activity: Not on file   Other Topics Concern    Not on file   Social History Narrative    Not on file     Social Determinants of Health     Financial Resource Strain:     Difficulty of Paying Living Expenses: Not on file   Food Insecurity:     Worried About Running Out of Food in the Last Year: Not on file    Gretchen of Food in the Last Year: Not on file   Transportation Needs:     Lack of Transportation (Medical): Not on file    Lack of Transportation (Non-Medical): Not on file   Physical Activity:     Days of Exercise per Week: Not on file    Minutes of Exercise per Session: Not on file   Stress:     Feeling of Stress : Not on file   Social Connections:     Frequency of Communication with Friends and Family: Not on file    Frequency of Social Gatherings with Friends and Family: Not on file    Attends [employer] Services: Not on file    Active Member of [address] [place] [address] or Organizations: Not on file    Attends Club or Organization Meetings: Not on file    Marital Status: Not on file   Intimate Partner Violence:     Fear of Current or Ex-Partner: Not on file    Emotionally Abused: Not on file    Physically Abused: Not on file    Sexually Abused: Not on file   Housing Stability:     Unable to Pay for Housing in the Last Year: Not on file    Number of [place] in the Last Year: Not on file    Unstable Housing in the Last Year: Not on file     Family History:   Family History   Problem Relation Age of Onset    Cancer Mother     Cancer Father          Objective:     Vitals  Vitals:    05/12/22 1535 05/12/22 1539   BP: (!) 186/77 (!) 186/81   Pulse: 85 84   Resp: 18    Temp: 98.1 °F (36.7 °C)    SpO2: 98%      Physical Exam  Physical Exam  HENT:      Head: Normocephalic and atraumatic.    Eyes:      Conjunctiva/sclera: Conjunctivae normal.   Pulmonary:      Effort: Pulmonary effort is normal. Abdominal:      Palpations: Abdomen is soft. Musculoskeletal:      Cervical back: Normal range of motion and neck supple. Skin:     General: Skin is warm and dry. Neurological:      Mental Status: She is alert and oriented to person, place, and time. No results found for this visit on 05/12/22. Assessment/Plan:     Romeo Velasco is a 80 y.o. female with   1. Frequency of urination    2. Urgency of urination    3. Presence of neurostimulator    4. Nocturia    5. Urinary frequency    6. Squamous cell carcinoma of vagina [facility])    Old records reviewed, outside records reviewed, urodynamics were reviewed    Patient has a history of urinary urgency frequency and urge incontinence. She had been treated with a neurostimulator how despite multiple attempts this has not been working very well. She was interested in Botox injections however her previous doctor had told her this may not be a good choice in case she went into retention because of her squamous cell carcinoma. I think she is a good candidate for this given that nothing else is working and we will use 75 units. The risk and benefits were discussed with her at length. The patient was counseled on surgical and non-surgical options. The patient elected to move forward with surgery. The risks and benefits of surgery including but not limited to bleeding, infection, injury to bowel, bladder, ureter, or other internal organs, transfusion, pain, bowel dysfunction, urinary incontinence, and sexual dysfunction were discussed at length. All questions were answered to the patients satisfaction. The patient elected to undergo Botox and cystourethroscopy. The risk and benefits of synthetic material, including mesh, were explained to the patient and all questions were answered. Preop orders were placed  No orders of the defined types were placed in this encounter. No orders of the defined types were placed in this encounter.       SAMEERA MAHAN Maria Esther Vaughn MD

## 2022-05-12 NOTE — LETTER
[facility]  [address] [facility] [address]  Phone: [phone]  Fax: [phone]    Kathryn Ghotra MD    May 12, 2022     Coleen Shepherd MD  [facility] [address] [address]    Patient: Kvng Chand   MR Number: [id number]   YOB: 1934   Date of Visit: 5/12/2022       Dear Coleen Shepherd:    Thank you for referring Jerome Moctezuma to me for evaluation/treatment. Below are the relevant portions of my assessment and plan of care. If you have questions, please do not hesitate to call me. I look forward to following Mahsa Duran along with you.     Sincerely,      Kathryn Ghotra MD

## 2022-06-02 ENCOUNTER — TELEPHONE (OUTPATIENT)
Dept: UROGYNECOLOGY | Age: 87
End: 2022-06-02

## 2022-06-02 DIAGNOSIS — F41.9 ANXIETY DUE TO INVASIVE PROCEDURE: Primary | ICD-10-CM

## 2022-06-02 RX ORDER — DIAZEPAM 5 MG/1
5 TABLET ORAL ONCE
Qty: 1 TABLET | Refills: 0 | OUTPATIENT
Start: 2022-06-11 | End: 2022-06-11

## 2022-06-02 RX ORDER — NITROFURANTOIN 25; 75 MG/1; MG/1
100 CAPSULE ORAL 2 TIMES DAILY
Qty: 14 CAPSULE | Refills: 0 | Status: SHIPPED | OUTPATIENT
Start: 2022-06-11 | End: 2022-06-18

## 2022-06-02 RX ORDER — IBUPROFEN 600 MG/1
600 TABLET ORAL ONCE
Qty: 1 TABLET | Refills: 0 | Status: SHIPPED | OUTPATIENT
Start: 2022-06-11 | End: 2022-06-11

## 2022-06-02 NOTE — TELEPHONE ENCOUNTER
Spoke with patient and pharmacy, premedications called in to pharmacy Macrobid 100 mg BID for 7 days, Valium 5 mg once, and ibuprofen 600 mg once. Patient aware she will need a . No other concerns at this time.

## 2022-06-14 ENCOUNTER — PROCEDURE VISIT (OUTPATIENT)
Dept: UROGYNECOLOGY | Age: 87
End: 2022-06-14
Payer: MEDICARE

## 2022-06-14 VITALS
TEMPERATURE: 97.3 F | RESPIRATION RATE: 16 BRPM | SYSTOLIC BLOOD PRESSURE: 93 MMHG | DIASTOLIC BLOOD PRESSURE: 61 MMHG | HEART RATE: 76 BPM | OXYGEN SATURATION: 99 %

## 2022-06-14 DIAGNOSIS — R39.15 URGENCY OF URINATION: Primary | ICD-10-CM

## 2022-06-14 DIAGNOSIS — N32.81 OAB (OVERACTIVE BLADDER): ICD-10-CM

## 2022-06-14 DIAGNOSIS — C52 SQUAMOUS CELL CARCINOMA OF VAGINA (HCC): ICD-10-CM

## 2022-06-14 DIAGNOSIS — R32 URINARY INCONTINENCE, UNSPECIFIED TYPE: ICD-10-CM

## 2022-06-14 PROCEDURE — G8421 BMI NOT CALCULATED: HCPCS | Performed by: OBSTETRICS & GYNECOLOGY

## 2022-06-14 PROCEDURE — 1090F PRES/ABSN URINE INCON ASSESS: CPT | Performed by: OBSTETRICS & GYNECOLOGY

## 2022-06-14 PROCEDURE — 1036F TOBACCO NON-USER: CPT | Performed by: OBSTETRICS & GYNECOLOGY

## 2022-06-14 PROCEDURE — G8427 DOCREV CUR MEDS BY ELIG CLIN: HCPCS | Performed by: OBSTETRICS & GYNECOLOGY

## 2022-06-14 PROCEDURE — 0509F URINE INCON PLAN DOCD: CPT | Performed by: OBSTETRICS & GYNECOLOGY

## 2022-06-14 PROCEDURE — 1123F ACP DISCUSS/DSCN MKR DOCD: CPT | Performed by: OBSTETRICS & GYNECOLOGY

## 2022-06-14 PROCEDURE — 99213 OFFICE O/P EST LOW 20 MIN: CPT | Performed by: OBSTETRICS & GYNECOLOGY

## 2022-06-14 NOTE — PROGRESS NOTES
6/14/2022       HPI:     Name: Alley Simpson  YOB: 1934    CC: Patient with urinary urgency, frequency, overactive bladder. HPI: Alley Simpson is a 80 y.o. female who is here for intravesical botulinum toxin A injection. Past Medical History:   Past Medical History:   Diagnosis Date    Atrial fibrillation ([address]     Cancer ([address]     vaginal w/radiation and chemo    Hyperlipidemia     Hypertension     PONV (postoperative nausea and vomiting)      Past Surgical History:   Past Surgical History:   Procedure Laterality Date    APPENDECTOMY      BLADDER SURGERY      bladder stimulator    BREAST REDUCTION SURGERY  1988    FOOT SURGERY  2011    left foot    HYSTERECTOMY (CERVIX STATUS UNKNOWN)      OTHER SURGICAL HISTORY      watchman device    SHOULDER ARTHROSCOPY      bilateral    TONSILLECTOMY      UPPER GASTROINTESTINAL ENDOSCOPY N/A 11/22/2019    [facility]     Current Medications:  Current Outpatient Medications   Medication Sig Dispense Refill    nitrofurantoin, macrocrystal-monohydrate, (MACROBID) 100 MG capsule Take 1 capsule by mouth 2 times daily for 7 days 14 capsule 0    lidocaine (XYLOCAINE) 5 % ointment       lidocaine-prilocaine (EMLA) 2.5-2.5 % cream       predniSONE (DELTASONE) 5 MG tablet TAKE 1 TABLET BY MOUTH DAILY      pregabalin (LYRICA) 50 MG capsule TAKE 1 CAPSULE BY MOUTH THREE TIMES DAILY      aspirin 81 MG chewable tablet Take 81 mg by mouth daily      amLODIPine (NORVASC) 5 MG tablet Take 5 mg by mouth daily      dorzolamide (TRUSOPT) 2 % ophthalmic solution INSTILL 1 DROP IN BOTH EYES TWICE DAILY      zolpidem (AMBIEN) 10 MG tablet Take 10 mg by mouth as needed.       DULoxetine (CYMBALTA) 30 MG extended release capsule Take 30 mg by mouth daily      benazepril (LOTENSIN) 40 MG tablet Take 40 mg by mouth daily      labetalol (NORMODYNE) 100 MG tablet Take 100 mg by mouth 2 times daily      latanoprost (XALATAN) 0.005 % ophthalmic solution 1 drop nightly      flecainide (TAMBOCOR) 50 MG tablet Take 50 mg by mouth 2 times daily      pravastatin (PRAVACHOL) 10 MG tablet Take 10 mg by mouth daily       vitamin E 400 UNIT capsule Take 400 Units by mouth 3 times daily       Pentoxifylline (TRENTAL PO) Take 400 mg by mouth three times daily       triamterene-hydrochlorothiazide (MAXZIDE-25) 37.5-25 MG per tablet Take 1 tablet by mouth daily.  Probiotic Product (PROBIOTIC PO) Take  by mouth.  ibuprofen (ADVIL;MOTRIN) 600 MG tablet Take 1 tablet by mouth once for 1 dose Take 1 hour prior to Botox procedure. 1 tablet 0    solifenacin (VESICARE) 5 MG tablet TAKE 1 TABLET BY MOUTH DAILY 90 tablet 0     No current facility-administered medications for this visit. Allergies: Allergies   Allergen Reactions    Hydrocodone Itching and Nausea Only     Itching, nausea  Itching, nausea  Itching, nausea    Atorvastatin      Other reaction(s): Muscle Aches  Other reaction(s): Muscle Aches  Other reaction(s):  Other (See Comments)  Muscle ache and fatigue  Other reaction(s): Myalgia  Other reaction(s): Muscle Aches  Muscle ache and fatigue  Other reaction(s): Muscle Aches  Other reaction(s): Muscle Aches     Social History:   Social History     Socioeconomic History    Marital status:      Spouse name: Not on file    Number of children: Not on file    Years of education: Not on file    Highest education level: Not on file   Occupational History    Not on file   Tobacco Use    Smoking status: Never Smoker    Smokeless tobacco: Never Used   Substance and Sexual Activity    Alcohol use: Yes     Comment: occassionally    Drug use: Never    Sexual activity: Not on file   Other Topics Concern    Not on file   Social History Narrative    Not on file     Social Determinants of Health     Financial Resource Strain:     Difficulty of Paying Living Expenses: Not on file   Food Insecurity:     Worried About Running Out of Food in the Last Year: Not on file    Gretchen of Food in the Last Year: Not on file   Transportation Needs:     Lack of Transportation (Medical): Not on file    Lack of Transportation (Non-Medical): Not on file   Physical Activity:     Days of Exercise per Week: Not on file    Minutes of Exercise per Session: Not on file   Stress:     Feeling of Stress : Not on file   Social Connections:     Frequency of Communication with Friends and Family: Not on file    Frequency of Social Gatherings with Friends and Family: Not on file    Attends Congregation Services: Not on file    Active Member of [address] [place] [address] [employer] or Organizations: Not on file    Attends Club or Organization Meetings: Not on file    Marital Status: Not on file   Intimate Partner Violence:     Fear of Current or Ex-Partner: Not on file    Emotionally Abused: Not on file    Physically Abused: Not on file    Sexually Abused: Not on file   Housing Stability:     Unable to Pay for Housing in the Last Year: Not on file    Number of [place] in the Last Year: Not on file    Unstable Housing in the Last Year: Not on file     Family History:   Family History   Problem Relation Age of Onset    Cancer Mother     Cancer Father          Objective:     Vital Signs  Vitals:    06/14/22 0828   BP: 93/61   Pulse: 76   Resp: 16   Temp: 97.3 °F (36.3 °C)   SpO2: 99%      Physical Exam  Physical Exam  Necrotic vaginal tissue secondary to radiation changes. The urethra was not visible. Despite gentle attempts with a urethral dilator I could not find the urethral meatus and therefore stopped. No results found for this visit on 06/14/22. Assessment/Plan:     Phillip Height is a 80 y.o. female    Diagnosis Orders   1. Urgency of urination     2. Urinary incontinence, unspecified type     3. Squamous cell carcinoma of vagina ([address]     4.  OAB (overactive bladder)     Patient has a longstanding history of overactive bladder probably secondary to radiation changes. We discussed doing Botox injections however today changes associated to the vagina because of radiation are significant and prohibits us from moving forward. I am going to discuss this with urology and probably transfer her for potential care to them. No orders of the defined types were placed in this encounter. No orders of the defined types were placed in this encounter.       Tedra Apley, MD

## 2022-06-27 ENCOUNTER — TELEPHONE (OUTPATIENT)
Dept: UROGYNECOLOGY | Age: 87
End: 2022-06-27

## 2022-06-27 NOTE — TELEPHONE ENCOUNTER
Per Jamie Jimenez NP she would like patient to come in to have her interstim checked out. Patient states she is having troubles and thinks the battery is dead. Patient made appointment for Wednesday 6/29 with Jamie Jimenez.

## 2022-06-28 ENCOUNTER — TELEPHONE (OUTPATIENT)
Dept: UROGYNECOLOGY | Age: 87
End: 2022-06-28

## 2022-06-28 RX ORDER — SOLIFENACIN SUCCINATE 5 MG/1
5 TABLET, FILM COATED ORAL DAILY
Qty: 90 TABLET | Refills: 0 | Status: SHIPPED | OUTPATIENT
Start: 2022-06-28 | End: 2022-09-26

## 2022-06-28 NOTE — TELEPHONE ENCOUNTER
Spoke with Sean Ferrell over the phone. Informed her that provider would like her to see a urologist to manage her bladder and urethral care, as well as Botox injections into the bladder if they are able to do so. Gave her the information to [facility] with [facility] whom she has been referred to. Per provider we can still manage this patient's vesicare prescriptions, patient aware that Rx has been sent to her preferred pharmacy. Patient is agreeable and thankful.  She has no other concerns at this time and states she will make an appointment with recommended urologist.

## 2022-12-13 NOTE — PROGRESS NOTES
ENDOSCOPY PREOP INSTRUCTIONS      You are scheduled for a procedure at [facility] on 12-15 @ 1200. You will need to arrival by: 1030 (at least an hour & a half prior to planned start time)  Report to the MAIN entrance on [address] and register at the surgery center on the left-hand side of the lobby  You will need your insurance card and photo id and a list of all medications taken on a regular basis. Please include the dose/frequency. For your procedure:     PLEASE FOLLOW ALL INSTRUCTIONS & PREPS GIVEN TO YOU BY YOUR DOCTOR'S OFFICE. If you have not received these instructions yet, please call the office immediately. Make sure to read them as soon as received. If you are taking blood thinners, Aspirin or diabetic medication, make sure to call your doctor as soon as possible for instructions prior to your procedure. Please dress comfortably and do not wear any lotion, powders or jewelry  Arrange for someone to be with you and sign you out & drive you home after your procedure. THIS PERSON MUST WAIT AT HOSPITAL THE ENTIRE TIME. We allow 2 adult visitors with you in the hospital & masks are strongly recommended.     WOMEN ONLY OF CHILDBEARING AGE: Please make sure to be able to give a urine sample on arrival      If you have further questions, you may contact your Endoscopist's office or Pre Admission Testing staff at [phone]

## 2022-12-15 ENCOUNTER — ANESTHESIA (OUTPATIENT)
Dept: ENDOSCOPY | Age: 87
End: 2022-12-15
Payer: MEDICARE

## 2022-12-15 ENCOUNTER — HOSPITAL ENCOUNTER (OUTPATIENT)
Age: 87
Setting detail: OUTPATIENT SURGERY
Discharge: HOME | End: 2022-12-15
Attending: INTERNAL MEDICINE | Admitting: INTERNAL MEDICINE
Payer: MEDICARE

## 2022-12-15 ENCOUNTER — ANESTHESIA EVENT (OUTPATIENT)
Dept: ENDOSCOPY | Age: 87
End: 2022-12-15
Payer: MEDICARE

## 2022-12-15 VITALS
TEMPERATURE: 97.1 F | HEIGHT: 61 IN | HEART RATE: 73 BPM | DIASTOLIC BLOOD PRESSURE: 86 MMHG | RESPIRATION RATE: 18 BRPM | SYSTOLIC BLOOD PRESSURE: 153 MMHG | OXYGEN SATURATION: 98 % | BODY MASS INDEX: 27.19 KG/M2 | WEIGHT: 144 LBS

## 2022-12-15 LAB
A/G RATIO: 1.4 (ref 1.1–2.2)
ALBUMIN SERPL-MCNC: 3.7 G/DL (ref 3.4–5)
ALP BLD-CCNC: 86 U/L (ref 40–129)
ALT SERPL-CCNC: 7 U/L (ref 10–40)
ANION GAP SERPL CALCULATED.3IONS-SCNC: 10 MMOL/L (ref 3–16)
AST SERPL-CCNC: 20 U/L (ref 15–37)
BILIRUB SERPL-MCNC: <0.2 MG/DL (ref 0–1)
BUN BLDV-MCNC: 20 MG/DL (ref 7–20)
CALCIUM SERPL-MCNC: 10 MG/DL (ref 8.3–10.6)
CHLORIDE BLD-SCNC: 104 MMOL/L (ref 99–110)
CO2: 25 MMOL/L (ref 21–32)
CREAT SERPL-MCNC: 1 MG/DL (ref 0.6–1.2)
GFR SERPL CREATININE-BSD FRML MDRD: 54 ML/MIN/{1.73_M2}
GLUCOSE BLD-MCNC: 112 MG/DL (ref 70–99)
HCT VFR BLD CALC: 35.8 % (ref 36–48)
HEMOGLOBIN: 11.9 G/DL (ref 12–16)
INR BLD: 1.08 (ref 0.87–1.14)
LIPASE: 26 U/L (ref 13–60)
MCH RBC QN AUTO: 30.1 PG (ref 26–34)
MCHC RBC AUTO-ENTMCNC: 33.3 G/DL (ref 31–36)
MCV RBC AUTO: 90.3 FL (ref 80–100)
PDW BLD-RTO: 13.5 % (ref 12.4–15.4)
PLATELET # BLD: 195 K/UL (ref 135–450)
PMV BLD AUTO: 8.7 FL (ref 5–10.5)
POTASSIUM SERPL-SCNC: 4.6 MMOL/L (ref 3.5–5.1)
PROTHROMBIN TIME: 14 SEC (ref 11.7–14.5)
RBC # BLD: 3.97 M/UL (ref 4–5.2)
SODIUM BLD-SCNC: 139 MMOL/L (ref 136–145)
TOTAL PROTEIN: 6.3 G/DL (ref 6.4–8.2)
WBC # BLD: 7.5 K/UL (ref 4–11)

## 2022-12-15 PROCEDURE — 7100000010 HC PHASE II RECOVERY - FIRST 15 MIN: Performed by: INTERNAL MEDICINE

## 2022-12-15 PROCEDURE — 2580000003 HC RX 258: Performed by: ANESTHESIOLOGY

## 2022-12-15 PROCEDURE — 3700000000 HC ANESTHESIA ATTENDED CARE: Performed by: INTERNAL MEDICINE

## 2022-12-15 PROCEDURE — 7100000011 HC PHASE II RECOVERY - ADDTL 15 MIN: Performed by: INTERNAL MEDICINE

## 2022-12-15 PROCEDURE — 83690 ASSAY OF LIPASE: CPT

## 2022-12-15 PROCEDURE — 85610 PROTHROMBIN TIME: CPT

## 2022-12-15 PROCEDURE — 6360000002 HC RX W HCPCS: Performed by: NURSE ANESTHETIST, CERTIFIED REGISTERED

## 2022-12-15 PROCEDURE — 2500000003 HC RX 250 WO HCPCS: Performed by: NURSE ANESTHETIST, CERTIFIED REGISTERED

## 2022-12-15 PROCEDURE — 3700000001 HC ADD 15 MINUTES (ANESTHESIA): Performed by: INTERNAL MEDICINE

## 2022-12-15 PROCEDURE — 3609020800 HC EGD W/EUS FNA: Performed by: INTERNAL MEDICINE

## 2022-12-15 PROCEDURE — 85027 COMPLETE CBC AUTOMATED: CPT

## 2022-12-15 PROCEDURE — 80053 COMPREHEN METABOLIC PANEL: CPT

## 2022-12-15 RX ORDER — SODIUM CHLORIDE 9 MG/ML
INJECTION, SOLUTION INTRAVENOUS PRN
Status: DISCONTINUED | OUTPATIENT
Start: 2022-12-15 | End: 2022-12-15 | Stop reason: HOSPADM

## 2022-12-15 RX ORDER — LIDOCAINE HYDROCHLORIDE 20 MG/ML
INJECTION, SOLUTION INFILTRATION; PERINEURAL PRN
Status: DISCONTINUED | OUTPATIENT
Start: 2022-12-15 | End: 2022-12-15 | Stop reason: SDUPTHER

## 2022-12-15 RX ORDER — SODIUM CHLORIDE 0.9 % (FLUSH) 0.9 %
5-40 SYRINGE (ML) INJECTION PRN
Status: DISCONTINUED | OUTPATIENT
Start: 2022-12-15 | End: 2022-12-15 | Stop reason: HOSPADM

## 2022-12-15 RX ORDER — SODIUM CHLORIDE 0.9 % (FLUSH) 0.9 %
5-40 SYRINGE (ML) INJECTION EVERY 12 HOURS SCHEDULED
Status: DISCONTINUED | OUTPATIENT
Start: 2022-12-15 | End: 2022-12-15 | Stop reason: HOSPADM

## 2022-12-15 RX ORDER — OXYCODONE AND ACETAMINOPHEN 7.5; 325 MG/1; MG/1
1 TABLET ORAL EVERY 4 HOURS PRN
COMMUNITY

## 2022-12-15 RX ORDER — PROPOFOL 10 MG/ML
INJECTION, EMULSION INTRAVENOUS PRN
Status: DISCONTINUED | OUTPATIENT
Start: 2022-12-15 | End: 2022-12-15 | Stop reason: SDUPTHER

## 2022-12-15 RX ORDER — HYDRALAZINE HYDROCHLORIDE 20 MG/ML
INJECTION INTRAMUSCULAR; INTRAVENOUS PRN
Status: DISCONTINUED | OUTPATIENT
Start: 2022-12-15 | End: 2022-12-15 | Stop reason: SDUPTHER

## 2022-12-15 RX ORDER — LIDOCAINE HYDROCHLORIDE 10 MG/ML
1 INJECTION, SOLUTION EPIDURAL; INFILTRATION; INTRACAUDAL; PERINEURAL
Status: DISCONTINUED | OUTPATIENT
Start: 2022-12-15 | End: 2022-12-15 | Stop reason: HOSPADM

## 2022-12-15 RX ORDER — FENTANYL CITRATE 50 UG/ML
INJECTION, SOLUTION INTRAMUSCULAR; INTRAVENOUS PRN
Status: DISCONTINUED | OUTPATIENT
Start: 2022-12-15 | End: 2022-12-15 | Stop reason: SDUPTHER

## 2022-12-15 RX ORDER — SODIUM CHLORIDE, SODIUM LACTATE, POTASSIUM CHLORIDE, CALCIUM CHLORIDE 600; 310; 30; 20 MG/100ML; MG/100ML; MG/100ML; MG/100ML
INJECTION, SOLUTION INTRAVENOUS CONTINUOUS
Status: DISCONTINUED | OUTPATIENT
Start: 2022-12-15 | End: 2022-12-15 | Stop reason: HOSPADM

## 2022-12-15 RX ADMIN — HYDRALAZINE HYDROCHLORIDE 5 MG: 20 INJECTION INTRAMUSCULAR; INTRAVENOUS at 12:39

## 2022-12-15 RX ADMIN — PROPOFOL 60 MG: 10 INJECTION, EMULSION INTRAVENOUS at 12:20

## 2022-12-15 RX ADMIN — SODIUM CHLORIDE, POTASSIUM CHLORIDE, SODIUM LACTATE AND CALCIUM CHLORIDE: 600; 310; 30; 20 INJECTION, SOLUTION INTRAVENOUS at 11:13

## 2022-12-15 RX ADMIN — LIDOCAINE HYDROCHLORIDE 50 MG: 20 INJECTION, SOLUTION INFILTRATION; PERINEURAL at 12:20

## 2022-12-15 RX ADMIN — FENTANYL CITRATE 50 MCG: 50 INJECTION, SOLUTION INTRAMUSCULAR; INTRAVENOUS at 12:20

## 2022-12-15 RX ADMIN — FENTANYL CITRATE 50 MCG: 50 INJECTION, SOLUTION INTRAMUSCULAR; INTRAVENOUS at 12:39

## 2022-12-15 RX ADMIN — HYDRALAZINE HYDROCHLORIDE 5 MG: 20 INJECTION INTRAMUSCULAR; INTRAVENOUS at 12:27

## 2022-12-15 RX ADMIN — PROPOFOL 50 MCG/KG/MIN: 10 INJECTION, EMULSION INTRAVENOUS at 12:21

## 2022-12-15 ASSESSMENT — PAIN DESCRIPTION - DESCRIPTORS: DESCRIPTORS: DISCOMFORT

## 2022-12-15 ASSESSMENT — PAIN - FUNCTIONAL ASSESSMENT
PAIN_FUNCTIONAL_ASSESSMENT: PREVENTS OR INTERFERES WITH MANY ACTIVE NOT PASSIVE ACTIVITIES
PAIN_FUNCTIONAL_ASSESSMENT: 0-10

## 2022-12-15 ASSESSMENT — PAIN SCALES - GENERAL
PAINLEVEL_OUTOF10: 0

## 2022-12-15 ASSESSMENT — ENCOUNTER SYMPTOMS: SHORTNESS OF BREATH: 0

## 2022-12-15 NOTE — H&P
Gastroenterology Note             Pre-operative History and Physical    Patient: Reilly Somers  : 1934  CSN: [id number]    History Obtained From:  patient and/or guardian. HISTORY OF PRESENT ILLNESS:    The patient is a 80 y.o. female  here for abnormal CT abd. .      Past Medical History:    Past Medical History:   Diagnosis Date    Atrial fibrillation ([address]     Cancer ([address]     vaginal w/radiation and chemo    Hyperlipidemia     Hypertension     PONV (postoperative nausea and vomiting)      Past Surgical History:    Past Surgical History:   Procedure Laterality Date    APPENDECTOMY      BLADDER SURGERY      bladder stimulator    BREAST REDUCTION SURGERY      FOOT SURGERY      left foot    HYSTERECTOMY (CERVIX STATUS UNKNOWN)      OTHER SURGICAL HISTORY      watchman device    SHOULDER ARTHROSCOPY      bilateral    TONSILLECTOMY      UPPER GASTROINTESTINAL ENDOSCOPY N/A 2019    EGD BIOPSY performed by Chai Buitrago MD at [address]     Medications Prior to Admission:   No current facility-administered medications on file prior to encounter. Current Outpatient Medications on File Prior to Encounter   Medication Sig Dispense Refill    oxyCODONE-acetaminophen (PERCOCET) 7.5-325 MG per tablet Take 1 tablet by mouth every 4 hours as needed for Pain.      solifenacin (VESICARE) 5 MG tablet Take 1 tablet by mouth daily 90 tablet 0    ibuprofen (ADVIL;MOTRIN) 600 MG tablet Take 1 tablet by mouth once for 1 dose Take 1 hour prior to Botox procedure.  1 tablet 0    lidocaine (XYLOCAINE) 5 % ointment       lidocaine-prilocaine (EMLA) 2.5-2.5 % cream       predniSONE (DELTASONE) 5 MG tablet TAKE 1 TABLET BY MOUTH DAILY      pregabalin (LYRICA) 50 MG capsule TAKE 1 CAPSULE BY MOUTH THREE TIMES DAILY      aspirin 81 MG chewable tablet Take 81 mg by mouth daily      amLODIPine (NORVASC) 5 MG tablet Take 5 mg by mouth daily      dorzolamide (TRUSOPT) 2 % ophthalmic solution INSTILL 1 DROP IN BOTH EYES TWICE DAILY      zolpidem (AMBIEN) 10 MG tablet Take 10 mg by mouth as needed. DULoxetine (CYMBALTA) 30 MG extended release capsule Take 30 mg by mouth daily      benazepril (LOTENSIN) 40 MG tablet Take 40 mg by mouth daily      labetalol (NORMODYNE) 100 MG tablet Take 100 mg by mouth 2 times daily      latanoprost (XALATAN) 0.005 % ophthalmic solution 1 drop nightly      flecainide (TAMBOCOR) 50 MG tablet Take 50 mg by mouth 2 times daily      pravastatin (PRAVACHOL) 10 MG tablet Take 10 mg by mouth daily       vitamin E 400 UNIT capsule Take 400 Units by mouth 3 times daily       Pentoxifylline (TRENTAL PO) Take 400 mg by mouth three times daily       triamterene-hydrochlorothiazide (MAXZIDE-25) 37.5-25 MG per tablet Take 1 tablet by mouth daily. Probiotic Product (PROBIOTIC PO) Take  by mouth. Allergies:  Hydrocodone and Atorvastatin      Social History:   Social History     Tobacco Use    Smoking status: Never    Smokeless tobacco: Never   Substance Use Topics    Alcohol use: Yes     Comment: occassionally     Family History:   Family History   Problem Relation Age of Onset    Cancer Mother     Cancer Father        PHYSICAL EXAM:      BP (!) 197/58   Pulse 78   Temp 97.9 °F (36.6 °C) (Temporal)   Resp 15   Ht 5' 1\" (1.549 m)   Wt 144 lb (65.3 kg)   SpO2 99%   BMI 27.21 kg/m²  I        Heart:   within normal limits    Lungs:  CTA bilat anteriorly,  Normal effort    Abdomen:   soft, NT, ND      ASA Grade:  ASA 3 - Patient with moderate systemic disease with functional limitations    Mallampati Class: 2      ASSESSMENT AND PLAN:    1. Patient is a 80 y.o. female here for EGD/Colonoscopy. 2.  Procedure options, risks and benefits reviewed with patient. Patient expresses understanding and wishes to proceed. Alvarez Garcia MD,   Keiry Blum  12/15/2022    Please note that some or all of this record was generated using voice recognition software.  If there are any questions about the content of this document, please contact the author as some errors in translation may have occurred.

## 2022-12-15 NOTE — ANESTHESIA PRE PROCEDURE
Department of Anesthesiology  Preprocedure Note       Name:  Camilo Oliveira   Age:  80 y.o.  :  1934                                          MRN:  [id number]         Date:  12/15/2022      Surgeon: Teena Perry):  Ernie Soto MD    Procedure: ESOPHAGOGASTRODUODENOSCOPY  WITH ENDOSCOPIC ULTRASOUND AND FINE NEEDLE ASPIRATION    Medications prior to admission:   Prior to Admission medications    Medication Sig Start Date End Date Taking? Authorizing Provider   oxyCODONE-acetaminophen (PERCOCET) 7.5-325 MG per tablet Take 1 tablet by mouth every 4 hours as needed for Pain. Historical Provider, MD   solifenacin (VESICARE) 5 MG tablet Take 1 tablet by mouth daily 22  Gray Romano MD   ibuprofen (ADVIL;MOTRIN) 600 MG tablet Take 1 tablet by mouth once for 1 dose Take 1 hour prior to Botox procedure. 22  Gray Romano MD   lidocaine (XYLOCAINE) 5 % ointment  5/3/22   Historical Provider, MD   lidocaine-prilocaine (EMLA) 2.5-2.5 % cream  22   Historical Provider, MD   predniSONE (DELTASONE) 5 MG tablet TAKE 1 TABLET BY MOUTH DAILY 3/21/22   Historical Provider, MD   pregabalin (LYRICA) 50 MG capsule TAKE 1 CAPSULE BY MOUTH THREE TIMES DAILY 3/22/22   Historical Provider, MD   aspirin 81 MG chewable tablet Take 81 mg by mouth daily    Historical Provider, MD   amLODIPine (NORVASC) 5 MG tablet Take 5 mg by mouth daily 10/18/21   Historical Provider, MD   dorzolamide (TRUSOPT) 2 % ophthalmic solution INSTILL 1 DROP IN BOTH EYES TWICE DAILY 10/17/21   Historical Provider, MD   zolpidem (AMBIEN) 10 MG tablet Take 10 mg by mouth as needed.  21   Historical Provider, MD   DULoxetine (CYMBALTA) 30 MG extended release capsule Take 30 mg by mouth daily    Historical Provider, MD   benazepril (LOTENSIN) 40 MG tablet Take 40 mg by mouth daily    Historical Provider, MD   labetalol (NORMODYNE) 100 MG tablet Take 100 mg by mouth 2 times daily    Historical Provider, MD   latanoprost (XALATAN) 0.005 % ophthalmic solution 1 drop nightly    Historical Provider, MD   flecainide (TAMBOCOR) 50 MG tablet Take 50 mg by mouth 2 times daily    Historical Provider, MD   pravastatin (PRAVACHOL) 10 MG tablet Take 10 mg by mouth daily     Historical Provider, MD   vitamin E 400 UNIT capsule Take 400 Units by mouth 3 times daily     Historical Provider, MD   Pentoxifylline (TRENTAL PO) Take 400 mg by mouth three times daily     Historical Provider, MD   triamterene-hydrochlorothiazide (MAXZIDE-25) 37.5-25 MG per tablet Take 1 tablet by mouth daily. Historical Provider, MD   Probiotic Product (PROBIOTIC PO) Take  by mouth. Historical Provider, MD       Current medications:    No current facility-administered medications for this visit. No current outpatient medications on file. Facility-Administered Medications Ordered in Other Visits   Medication Dose Route Frequency Provider Last Rate Last Admin    lidocaine PF 1 % injection 1 mL  1 mL IntraDERmal Once PRN Kathrin Payne MD        lactated ringers infusion   IntraVENous Continuous Kathrin Payne MD        sodium chloride flush 0.9 % injection 5-40 mL  5-40 mL IntraVENous 2 times per day Kathrin Payne MD        sodium chloride flush 0.9 % injection 5-40 mL  5-40 mL IntraVENous PRN Kathrin Payne MD        0.9 % sodium chloride infusion   IntraVENous PRN Kathrin Payne MD           Allergies: Allergies   Allergen Reactions    Hydrocodone Itching and Nausea Only     Itching, nausea  Itching, nausea  Itching, nausea    Atorvastatin      Other reaction(s): Muscle Aches  Other reaction(s): Muscle Aches  Other reaction(s):  Other (See Comments)  Muscle ache and fatigue  Other reaction(s): Myalgia  Other reaction(s): Muscle Aches  Muscle ache and fatigue  Other reaction(s): Muscle Aches  Other reaction(s): Muscle Aches       Problem List:    Patient Active Problem List   Diagnosis Code    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) I70.213    Bilateral carpal tunnel syndrome G56.03    Chronic pain disorder G89.4    Chronic rhinitis J31.0    CKD (chronic kidney disease) stage 3, GFR 30-59 ml/min (HCC) N18.30    Degenerative skin disorder L98.8    Dermatitis due to sun L57.8    Encounter for monitoring flecainide therapy Z51.81, Z79.899    Essential hypertension I10    First degree AV block I44.0    Gastroesophageal reflux disease without esophagitis K21.9    Hyperlipidemia E78.5    Neuropathic pain M79.2    Normocytic anemia D64.9    OAB (overactive bladder) N32.81    Paroxysmal atrial fibrillation (HCC) I48.0    Perineal pain R10.2    Peripheral arterial disease (HCC) I73.9    Proteinuria R80.9    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 L59.8, Y84.2    Reflux esophagitis K21.00    Sensorineural hearing loss H90.5    Squamous cell carcinoma of vagina ([address] C52    TIA (transient ischemic attack) G45.9    Urge incontinence of urine N39.41    Urinary incontinence R32    Chronic bilateral low back pain with left-sided sciatica M54.42, G89.29       Past Medical History:        Diagnosis Date    Atrial fibrillation (HCC)     Cancer ([address]     vaginal w/radiation and chemo    Hyperlipidemia     Hypertension     PONV (postoperative nausea and vomiting)        Past Surgical History:        Procedure Laterality Date    APPENDECTOMY      BLADDER SURGERY      bladder stimulator    BREAST REDUCTION SURGERY  1988    FOOT SURGERY  2011    left foot    HYSTERECTOMY (CERVIX STATUS UNKNOWN)      OTHER SURGICAL HISTORY      watchman device    SHOULDER ARTHROSCOPY      bilateral    TONSILLECTOMY      UPPER GASTROINTESTINAL ENDOSCOPY N/A 11/22/2019    EGD [facility]OPSY performed by Oni Moreno MD at [facility]. Devi Rosado History:    Social History     Tobacco Use    Smoking status: Never    Smokeless tobacco: Never   Substance Use Topics    Alcohol use: Yes     Comment: occassionally                                Counseling given: Not Answered      Vital Signs (Current): There were no vitals filed for this visit. BP Readings from Last 3 Encounters:   12/15/22 (!) 200/70   06/14/22 93/61   05/12/22 (!) 186/81       NPO Status:                                                                                 BMI:   Wt Readings from Last 3 Encounters:   12/15/22 144 lb (65.3 kg)   11/22/19 160 lb (72.6 kg)   08/03/16 150 lb (68 kg)     There is no height or weight on file to calculate BMI.    CBC:   Lab Results   Component Value Date/Time    WBC 6.7 12/08/2016 06:21 AM    RBC 4.05 12/08/2016 06:21 AM    HGB 11.9 12/08/2016 06:21 AM    HCT 37.4 12/08/2016 06:21 AM    MCV 92.4 12/08/2016 06:21 AM    RDW 14.4 12/08/2016 06:21 AM     12/08/2016 06:21 AM       CMP:   Lab Results   Component Value Date/Time     12/08/2016 06:22 AM    K 4.0 12/08/2016 06:22 AM    CL 98 12/08/2016 06:22 AM    CO2 23 12/08/2016 06:22 AM    BUN 28 12/08/2016 06:22 AM    CREATININE 1.2 12/08/2016 06:22 AM    GFRAA 52 12/08/2016 06:22 AM    LABGLOM 43 12/08/2016 06:22 AM    GLUCOSE 119 12/08/2016 06:22 AM    CALCIUM 9.9 12/08/2016 06:22 AM       POC Tests: No results for input(s): POCGLU, POCNA, POCK, POCCL, POCBUN, POCHEMO, POCHCT in the last 72 hours. Coags: No results found for: PROTIME, INR, APTT    HCG (If Applicable): No results found for: PREGTESTUR, PREGSERUM, HCG, HCGQUANT     ABGs: No results found for: PHART, PO2ART, NSQ6HFW, XOO2ABR, BEART, V9PXVWDN     Type & Screen (If Applicable):  No results found for: LABABO, [address]    Anesthesia Evaluation  Patient summary reviewed and Nursing notes reviewed   history of anesthetic complications: PONV.   Airway: Mallampati: II  TM distance: <3 FB   Neck ROM: full  Mouth opening: > = 3 FB   Dental: normal exam     Comment: crowns    Pulmonary:Negative Pulmonary ROS and normal exam  breath sounds clear to auscultation      (-) shortness of breath                           Cardiovascular:  Exercise tolerance: good (>4 METS),   (+) hypertension:, dysrhythmias: atrial fibrillation, hyperlipidemia    (-)  angina      Rhythm: regular  Rate: normal                    Neuro/Psych:   (+) neuromuscular disease:, TIA,             GI/Hepatic/Renal:   (+) GERD:, renal disease:,           Endo/Other:    (+) : arthritis: OA., malignancy/cancer. Abdominal:       Abdomen: soft. Vascular: negative vascular ROS. Other Findings:             Anesthesia Plan      MAC     ASA 3       Induction: intravenous. Anesthetic plan and risks discussed with patient. Plan discussed with CRNA.     Attending anesthesiologist reviewed and agrees with Preprocedure content                Dusty Tomlin DO   12/15/2022

## 2022-12-15 NOTE — PROGRESS NOTES
Ambulatory Surgery/Procedure Discharge Note    Vitals:    12/15/22 1320   BP: (!) 153/86   Pulse: 73   Resp: 18   Temp:    SpO2: 98%     BP meets katherin discharge criteria   In: 500 [I.V.:500]  Out: -     Restroom use offered before discharge. Yes, pt ambulated to bathroom, assist x1. Pain assessment:  level of pain (1-10, 10 severe)  Pain Level: 0  Pt to Endoscopy recovery post EGD with endoscopic ultrasound and fine needle aspiration. Pt denies pain at this time. Pt denies nausea at this time, pt tolerating PO fluids well. Discharge instructions given to pt's daughter and she states understanding of these instructions, Pt and pt's daughter state that pt is \"ready to go. \"         Patient discharged to home/self care.  Patient discharged via wheel chair by transporter to waiting family/S.O.       12/15/2022 1:47 PM

## 2022-12-15 NOTE — ANESTHESIA POSTPROCEDURE EVALUATION
Department of Anesthesiology  Postprocedure Note    Patient: Rosy Feliciano  MRN: [id number]  YOB: 1934  Date of evaluation: 12/15/2022      Procedure Summary     Date: 12/15/22 Room / Location: [facility]    Anesthesia Start: 3810 Anesthesia Stop: 7839    Procedure: ESOPHAGOGASTRODUODENOSCOPY  WITH ENDOSCOPIC ULTRASOUND AND FINE NEEDLE ASPIRATION Diagnosis:       Abnormal finding on imaging      (Abnormal finding on imaging [R93.89])    Surgeons: Nav Elvis Brunner, MD Responsible Provider: Claudeen Mocha, DO    Anesthesia Type: MAC ASA Status: 3          Anesthesia Type: No value filed.     Crescencio Phase I: Crescencio Score: 10    Crescencio Phase II: Crescencio Score: 9      Anesthesia Post Evaluation    Patient location during evaluation: PACU  Patient participation: complete - patient participated  Level of consciousness: awake  Pain score: 0  Airway patency: patent  Nausea & Vomiting: no nausea and no vomiting  Complications: no  Cardiovascular status: hemodynamically stable  Respiratory status: acceptable  Hydration status: stable

## 2022-12-15 NOTE — DISCHARGE INSTRUCTIONS
Follow up office visit in 1 week with [facility]/Dr Jennifer Lopez. ENDOSCOPY DISCHARGE INSTRUCTIONS:    Call the physician that did your procedure for any questions or concern:    [facility]: [phone]  DR. NANCY TALAMANTES      ACTIVITY:    There are potential side effects to the medications used for sedation and anesthesia during your procedure. These include:  Dizziness or light-headedness, confusion or memory loss, delayed reaction times, loss of coordination, nausea and vomiting. Because of your increased risk for injury, we ask that you observe the following precautions: For the next 24 hours,  DO NOT operate an automobile, bicycle, motorcycle, , power tools or large equipment of any kind. Do not drink alcohol, sign any legal documents or make any legal decisions for 24 hours. Do not bend your head over lower than your heart. DO sit on the side of bed/couch awhile before getting up. Plan on bedrest or quiet relaxation today. You may resume normal activities in 24 hours. DIET:    Your first meal today should be light, avoiding spicy and fatty foods. If you tolerate this first meal, then you may advance to your regular diet unless otherwise advised by your physician. NORMAL SYMPTOMS:  -Mild sore throat if youve had an EGD   -Gaseous discomfort    NOTIFY YOUR PHYSICIAN IF THESE SYMPTOMS OCCUR:  1. Fever (greater than 100)  5. Increased abdominal bloating  2. Severe pain    6. Excessive bleeding  3. Nausea and vomiting  7. Chest pain                                                                    4. Chills    8. Shortness of breath    ADDITIONAL INSTRUCTIONS:    Biopsy results: Call [address],[facility] for biopsy results in 1 week    Educational Information:          Please review these discharge instructions this evening or tomorrow for  information you may have forgotten. We want to thank you for choosing the [facility] as your health care provider.  We always strive to provide you with excellent care while you are here. You may receive a survey in the mail regarding your care. We would appreciate you taking a few minutes of your time to complete this survey.

## 2022-12-15 NOTE — PROCEDURES
Endoscopy Note - EGD/ EUS    Patient: Alexandre Plata  : 1934  CSN: [id number]    Procedure: Esophagogastroduodenoscopy, EUS - linear . Date:  12/15/2022     Surgeon:  Alvarez Garcia MD     Referring Physician:  Julio Kellogg MD    Preoperative Diagnosis:  Abnormal finding on imaging [R93.89]    Postoperative Diagnosis:  * No post-op diagnosis entered *    Anesthesia:  MAC    EBL: minimal to none. Indications: This is a 80y.o. year old female who presents today with  abnormal CT scan of the abdomen showing biliary dilatation-both intra and extrahepatic of unclear etiology. Marni Ruffing Description of Procedure:  Informed consent was obtained from the patient after explanation of indications, benefits and possible risks and complications of the procedure. The patient was then taken to the endoscopy suite, placed in the left lateral decubitus position and the above IV sedation was administrered. The Olympus video endoscope was passed through the hypopharynx into the esophagus. The scope was advanced all the way to the second portion of the duodenum. The GE junction was at approximately 36 cms. The scope was slowly withdrawn and mucosa was carefully evaluated including a retroflex view of the proximal stomach. Findings and interventions are described below. The patient was decompressed and the scope was then withdrawn. Linear echoendoscope was advanced over the tongue and the esophagus was intubated. Scope was gradually maneuvered through the esophagus into the stomach pylorus duodenal bulb and second part of the duodenum. Examination was performed upon gradual withdrawal of the scope. Gastric or Duodenal ulcer present: No    The patient tolerated the procedure well and was taken to the post anesthesia care unit in good condition. There were no immediate complications. Impression:    Mild esophagitis without endoscopic evidence of Wu's esophagus.   Small duodenal diverticulum located in the second part of the duodenum involving the major ampulla. AP and subcarinal spaces-normal  Celiac axis-normal with no associated lymphadenopathy. Body and tail of the pancreas-grossly normal except for the presence of a 5 mm cyst in the distal body of the pancreas, it did not communicate with the main pancreatic duct or a sidebranch. Benign appearance by EUS criteria. Head of the pancreas-grossly normal.  Pancreatic duct-normal  Common bile duct-dilated with filling defects consistent with choledocholithiasis. Recommendations:   ERCP with stone removal.  This will be discussed in detail with the patient. There is no clinical evidence of cholangitis. CBC and liver function test will be obtained. Freida Guillory MD, MD  [address]    Please note that some or all of this record was generated using voice recognition software. If there are any questions about the content of this document, please contact the author as some errors in translation may have occurred.

## 2023-01-29 ENCOUNTER — ANESTHESIA EVENT (OUTPATIENT)
Dept: ENDOSCOPY | Age: 88
End: 2023-01-29
Payer: MEDICARE

## 2023-01-30 ENCOUNTER — ANESTHESIA (OUTPATIENT)
Dept: ENDOSCOPY | Age: 88
End: 2023-01-30
Payer: MEDICARE

## 2023-01-30 ENCOUNTER — HOSPITAL ENCOUNTER (OUTPATIENT)
Age: 88
Setting detail: OUTPATIENT SURGERY
Discharge: HOME | End: 2023-01-30
Attending: INTERNAL MEDICINE | Admitting: INTERNAL MEDICINE
Payer: MEDICARE

## 2023-01-30 ENCOUNTER — APPOINTMENT (OUTPATIENT)
Dept: GENERAL RADIOLOGY | Age: 88
End: 2023-01-30
Attending: INTERNAL MEDICINE
Payer: MEDICARE

## 2023-01-30 VITALS
BODY MASS INDEX: 26.06 KG/M2 | RESPIRATION RATE: 20 BRPM | OXYGEN SATURATION: 96 % | HEIGHT: 61 IN | HEART RATE: 72 BPM | DIASTOLIC BLOOD PRESSURE: 63 MMHG | SYSTOLIC BLOOD PRESSURE: 168 MMHG | TEMPERATURE: 97.4 F | WEIGHT: 138 LBS

## 2023-01-30 LAB
GLUCOSE BLD-MCNC: 107 MG/DL (ref 70–99)
PERFORMED ON: ABNORMAL

## 2023-01-30 PROCEDURE — 6360000004 HC RX CONTRAST MEDICATION: Performed by: INTERNAL MEDICINE

## 2023-01-30 PROCEDURE — 2500000003 HC RX 250 WO HCPCS: Performed by: NURSE ANESTHETIST, CERTIFIED REGISTERED

## 2023-01-30 PROCEDURE — 3700000000 HC ANESTHESIA ATTENDED CARE: Performed by: INTERNAL MEDICINE

## 2023-01-30 PROCEDURE — 3609014300 HC ERCP BALLOON DILATE BILIARY/PANC DUCT/AMPULLA EA: Performed by: INTERNAL MEDICINE

## 2023-01-30 PROCEDURE — C1769 GUIDE WIRE: HCPCS | Performed by: INTERNAL MEDICINE

## 2023-01-30 PROCEDURE — 7100000010 HC PHASE II RECOVERY - FIRST 15 MIN: Performed by: INTERNAL MEDICINE

## 2023-01-30 PROCEDURE — 6370000000 HC RX 637 (ALT 250 FOR IP): Performed by: INTERNAL MEDICINE

## 2023-01-30 PROCEDURE — 6360000002 HC RX W HCPCS: Performed by: NURSE ANESTHETIST, CERTIFIED REGISTERED

## 2023-01-30 PROCEDURE — 3609015200 HC ERCP REMOVE CALCULI/DEBRIS BILIARY/PANCREAS DUCT: Performed by: INTERNAL MEDICINE

## 2023-01-30 PROCEDURE — 3700000001 HC ADD 15 MINUTES (ANESTHESIA): Performed by: INTERNAL MEDICINE

## 2023-01-30 PROCEDURE — 2580000003 HC RX 258: Performed by: ANESTHESIOLOGY

## 2023-01-30 PROCEDURE — 2720000010 HC SURG SUPPLY STERILE: Performed by: INTERNAL MEDICINE

## 2023-01-30 PROCEDURE — 7100000000 HC PACU RECOVERY - FIRST 15 MIN: Performed by: INTERNAL MEDICINE

## 2023-01-30 PROCEDURE — C2625 STENT, NON-COR, TEM W/DEL SY: HCPCS | Performed by: INTERNAL MEDICINE

## 2023-01-30 PROCEDURE — 3609015100 HC ERCP STENT PLACEMENT BILIARY/PANCREATIC DUCT: Performed by: INTERNAL MEDICINE

## 2023-01-30 PROCEDURE — 74330 X-RAY BILE/PANC ENDOSCOPY: CPT

## 2023-01-30 PROCEDURE — 6360000002 HC RX W HCPCS: Performed by: INTERNAL MEDICINE

## 2023-01-30 PROCEDURE — 7100000011 HC PHASE II RECOVERY - ADDTL 15 MIN: Performed by: INTERNAL MEDICINE

## 2023-01-30 PROCEDURE — 7100000001 HC PACU RECOVERY - ADDTL 15 MIN: Performed by: INTERNAL MEDICINE

## 2023-01-30 PROCEDURE — 3609014900 HC ERCP W/SPHINCTEROTOMY &/OR PAPILLOTOMY: Performed by: INTERNAL MEDICINE

## 2023-01-30 PROCEDURE — 2709999900 HC NON-CHARGEABLE SUPPLY: Performed by: INTERNAL MEDICINE

## 2023-01-30 DEVICE — BILIARY STENT WITH NAVIFLEXTM RX DELIVERY SYSTEM
Type: IMPLANTABLE DEVICE | Site: BILE DUCT | Status: FUNCTIONAL
Brand: ADVANIX™ BILIARY

## 2023-01-30 RX ORDER — LIDOCAINE HYDROCHLORIDE 10 MG/ML
1 INJECTION, SOLUTION EPIDURAL; INFILTRATION; INTRACAUDAL; PERINEURAL
Status: DISCONTINUED | OUTPATIENT
Start: 2023-01-30 | End: 2023-01-30 | Stop reason: HOSPADM

## 2023-01-30 RX ORDER — FENTANYL CITRATE 50 UG/ML
25 INJECTION, SOLUTION INTRAMUSCULAR; INTRAVENOUS EVERY 5 MIN PRN
Status: DISCONTINUED | OUTPATIENT
Start: 2023-01-30 | End: 2023-01-30 | Stop reason: HOSPADM

## 2023-01-30 RX ORDER — ESMOLOL HYDROCHLORIDE 10 MG/ML
INJECTION INTRAVENOUS PRN
Status: DISCONTINUED | OUTPATIENT
Start: 2023-01-30 | End: 2023-01-30 | Stop reason: SDUPTHER

## 2023-01-30 RX ORDER — ONDANSETRON 2 MG/ML
INJECTION INTRAMUSCULAR; INTRAVENOUS PRN
Status: DISCONTINUED | OUTPATIENT
Start: 2023-01-30 | End: 2023-01-30 | Stop reason: SDUPTHER

## 2023-01-30 RX ORDER — SODIUM CHLORIDE, SODIUM LACTATE, POTASSIUM CHLORIDE, CALCIUM CHLORIDE 600; 310; 30; 20 MG/100ML; MG/100ML; MG/100ML; MG/100ML
INJECTION, SOLUTION INTRAVENOUS CONTINUOUS
Status: DISCONTINUED | OUTPATIENT
Start: 2023-01-30 | End: 2023-01-30 | Stop reason: HOSPADM

## 2023-01-30 RX ORDER — HYDRALAZINE HYDROCHLORIDE 20 MG/ML
INJECTION INTRAMUSCULAR; INTRAVENOUS PRN
Status: DISCONTINUED | OUTPATIENT
Start: 2023-01-30 | End: 2023-01-30 | Stop reason: SDUPTHER

## 2023-01-30 RX ORDER — IPRATROPIUM BROMIDE AND ALBUTEROL SULFATE 2.5; .5 MG/3ML; MG/3ML
1 SOLUTION RESPIRATORY (INHALATION)
Status: DISCONTINUED | OUTPATIENT
Start: 2023-01-30 | End: 2023-01-30 | Stop reason: HOSPADM

## 2023-01-30 RX ORDER — ROCURONIUM BROMIDE 10 MG/ML
INJECTION, SOLUTION INTRAVENOUS PRN
Status: DISCONTINUED | OUTPATIENT
Start: 2023-01-30 | End: 2023-01-30 | Stop reason: SDUPTHER

## 2023-01-30 RX ORDER — ACETAMINOPHEN 325 MG/1
650 TABLET ORAL
Status: DISCONTINUED | OUTPATIENT
Start: 2023-01-30 | End: 2023-01-30 | Stop reason: HOSPADM

## 2023-01-30 RX ORDER — SODIUM CHLORIDE 0.9 % (FLUSH) 0.9 %
5-40 SYRINGE (ML) INJECTION EVERY 12 HOURS SCHEDULED
Status: DISCONTINUED | OUTPATIENT
Start: 2023-01-30 | End: 2023-01-30 | Stop reason: HOSPADM

## 2023-01-30 RX ORDER — SODIUM CHLORIDE 0.9 % (FLUSH) 0.9 %
5-40 SYRINGE (ML) INJECTION PRN
Status: DISCONTINUED | OUTPATIENT
Start: 2023-01-30 | End: 2023-01-30 | Stop reason: HOSPADM

## 2023-01-30 RX ORDER — SODIUM CHLORIDE 9 MG/ML
INJECTION, SOLUTION INTRAVENOUS PRN
Status: DISCONTINUED | OUTPATIENT
Start: 2023-01-30 | End: 2023-01-30 | Stop reason: HOSPADM

## 2023-01-30 RX ORDER — PROCHLORPERAZINE EDISYLATE 5 MG/ML
5 INJECTION INTRAMUSCULAR; INTRAVENOUS
Status: DISCONTINUED | OUTPATIENT
Start: 2023-01-30 | End: 2023-01-30 | Stop reason: HOSPADM

## 2023-01-30 RX ORDER — CIPROFLOXACIN 2 MG/ML
200 INJECTION, SOLUTION INTRAVENOUS ONCE
Status: COMPLETED | OUTPATIENT
Start: 2023-01-30 | End: 2023-01-30

## 2023-01-30 RX ORDER — ONDANSETRON 2 MG/ML
4 INJECTION INTRAMUSCULAR; INTRAVENOUS
Status: DISCONTINUED | OUTPATIENT
Start: 2023-01-30 | End: 2023-01-30 | Stop reason: HOSPADM

## 2023-01-30 RX ORDER — SUCCINYLCHOLINE CHLORIDE 20 MG/ML
INJECTION INTRAMUSCULAR; INTRAVENOUS PRN
Status: DISCONTINUED | OUTPATIENT
Start: 2023-01-30 | End: 2023-01-30 | Stop reason: SDUPTHER

## 2023-01-30 RX ORDER — LABETALOL HYDROCHLORIDE 5 MG/ML
10 INJECTION, SOLUTION INTRAVENOUS
Status: DISCONTINUED | OUTPATIENT
Start: 2023-01-30 | End: 2023-01-30 | Stop reason: HOSPADM

## 2023-01-30 RX ADMIN — CIPROFLOXACIN 200 MG: 2 INJECTION, SOLUTION INTRAVENOUS at 13:40

## 2023-01-30 RX ADMIN — SODIUM CHLORIDE, POTASSIUM CHLORIDE, SODIUM LACTATE AND CALCIUM CHLORIDE: 600; 310; 30; 20 INJECTION, SOLUTION INTRAVENOUS at 12:48

## 2023-01-30 RX ADMIN — ROCURONIUM BROMIDE 5 MG: 10 INJECTION INTRAVENOUS at 13:26

## 2023-01-30 RX ADMIN — CIPROFLOXACIN 400 MG: 2 INJECTION, SOLUTION INTRAVENOUS at 13:20

## 2023-01-30 RX ADMIN — SUCCINYLCHOLINE CHLORIDE 100 MG: 20 INJECTION, SOLUTION INTRAMUSCULAR; INTRAVENOUS; PARENTERAL at 13:28

## 2023-01-30 RX ADMIN — SUGAMMADEX 200 MG: 100 INJECTION, SOLUTION INTRAVENOUS at 14:33

## 2023-01-30 RX ADMIN — ROCURONIUM BROMIDE 25 MG: 10 INJECTION INTRAVENOUS at 13:34

## 2023-01-30 RX ADMIN — ONDANSETRON 4 MG: 2 INJECTION INTRAMUSCULAR; INTRAVENOUS at 13:52

## 2023-01-30 RX ADMIN — ESMOLOL HYDROCHLORIDE 10 MG: 10 INJECTION, SOLUTION INTRAVENOUS at 13:25

## 2023-01-30 RX ADMIN — ESMOLOL HYDROCHLORIDE 10 MG: 10 INJECTION, SOLUTION INTRAVENOUS at 13:45

## 2023-01-30 RX ADMIN — ESMOLOL HYDROCHLORIDE 10 MG: 10 INJECTION, SOLUTION INTRAVENOUS at 13:32

## 2023-01-30 RX ADMIN — INDOMETHACIN 100 MG: 50 SUPPOSITORY RECTAL at 14:39

## 2023-01-30 RX ADMIN — HYDRALAZINE HYDROCHLORIDE 2 MG: 20 INJECTION INTRAMUSCULAR; INTRAVENOUS at 13:50

## 2023-01-30 ASSESSMENT — PAIN SCALES - GENERAL: PAINLEVEL_OUTOF10: 0

## 2023-01-30 ASSESSMENT — LIFESTYLE VARIABLES: SMOKING_STATUS: 0

## 2023-01-30 ASSESSMENT — PAIN DESCRIPTION - DESCRIPTORS: DESCRIPTORS: STABBING

## 2023-01-30 ASSESSMENT — PAIN - FUNCTIONAL ASSESSMENT: PAIN_FUNCTIONAL_ASSESSMENT: 0-10

## 2023-01-30 NOTE — PROGRESS NOTES
Discussed HTN with patient. Took her 3 BP meds pre-op. Reports up because in hospital.  Has a BP cuff at home will check her pressure. Ordered to treat SBP > 180 and pre-op . /97 now.

## 2023-01-30 NOTE — PROGRESS NOTES
Ambulatory Surgery/Procedure Discharge Note    Vitals:    01/30/23 1543   BP: (!) 168/63   Pulse: 72   Resp: 20   Temp:    SpO2: 96%       In: 1234 [I.V.:1034]  Out: 0     Restroom use offered before discharge. Yes    Pain assessment:  none/alert and oriented  Pain Level: 0    Soft abdomen. Wanting sprite and states she feels someone poked her in the left eye. Bilateral eyes reddened. Call for Dr Manuel Yeager. Late entry :denies difficulty with vision. Given warm wash cloth to hold on eye and lights out in room. States this makes it feel better. Left forearm has a bruise the is about 2 inches wide and about 3 inches long. Has small hematoma at the most superior aspect of bruise. Called daughter to room   1558 Dr Manuel Yeager in to speak to her about the pain in left eye which she denies is pain but feels she was poked in eye   1600 Wants to void and stable when up to BR and voided. 36 Dr Manuel Yeager wants to come to see her regarding her eye which now she states is not bothering her. 1 Dr Manuel Yeager came to room and she said left eye is feeling alright now. He examined left forearm and noted small (about 2 cm) wide hematoma at superior aspect of bruise left arm. She denies pain except has some tenderness at hematoma. No active oozing. She states she bruises easily and was told not to stop aspirin. Daughter in room with Dr Manuel Yeager revisited her. (And for 1st visit)   1700 Wheeled to car. Denies nausea or abdominal; pain. Patient discharged to home/self care. Patient discharged via wheel chair by me to waiting family/S.O.   BP within 20% of pre op. Used adhesive remover to remove red dots and tape.        1/30/2023 3:48 PM

## 2023-01-30 NOTE — PROGRESS NOTES
1448 Admitted to PACU from [facility]. Connected to monitor. Report at bedside. HTN but not as high as admit pressure 200/71.

## 2023-01-30 NOTE — ANESTHESIA POSTPROCEDURE EVALUATION
Department of Anesthesiology  Postprocedure Note    Patient: Nino Shoulder  MRN: [id number]  YOB: 1934  Date of evaluation: 1/30/2023      Procedure Summary     Date: 01/30/23 Room / Location: [place] [address] / [facility]    Anesthesia Start: 1320 Anesthesia Stop: 1450    Procedures:       ERCP SPHINCTER/PAPILLOTOMY      ERCP DILATION BALLOON      ERCP STONE REMOVAL      ERCP STENT INSERTION Diagnosis:       Choledocholithiasis      (Choledocholithiasis [K80.50])    Surgeons: Ernie Lomax MD Responsible Provider: Hector Chavez MD    Anesthesia Type: general ASA Status: 3          Anesthesia Type: No value filed.     Crescencio Phase I: Crescencio Score: 8    Crescencio Phase II:        Anesthesia Post Evaluation    Patient location during evaluation: PACU  Level of consciousness: awake  Complications: no  Multimodal analgesia pain management approach

## 2023-01-30 NOTE — H&P
Gastroenterology Note             Pre-operative History and Physical    Patient: Franklin Tapia  : 1934  CSN: [id number]    History Obtained From:  patient and/or guardian. HISTORY OF PRESENT ILLNESS:    The patient is a 80 y.o. female  here for abnormal EUS showing choledocholitiasis. Past Medical History:    Past Medical History:   Diagnosis Date    Abdominal pain     Atrial fibrillation ([address]     Cancer ([address]     vaginal w/radiation and chemo    Cholelithiasis     COVID-19     2022    Dental crown present     permanent dental implant    Dysphagia     Fecal incontinence due to anorectal disorder     Gastroparesis     GERD (gastroesophageal reflux disease)     Hyperlipidemia     Hypertension     PONV (postoperative nausea and vomiting)     Rectal bleed     TIA (transient ischemic attack)     no residual    Vaginal cancer ([address]      Past Surgical History:    Past Surgical History:   Procedure Laterality Date    APPENDECTOMY      BLADDER SURGERY      bladder stimulator/does not work    [address] [place] [address]    FOOT SURGERY      left foot    HYSTERECTOMY (CERVIX STATUS UNKNOWN)      OTHER SURGICAL HISTORY      watchman device    SHOULDER ARTHROSCOPY      bilateral    TONSILLECTOMY      UPPER GASTROINTESTINAL ENDOSCOPY N/A 2019    EGD BIOPSY performed by Tom Tapia MD at [address] [place] [address] N/A 12/15/2022    ESOPHAGOGASTRODUODENOSCOPY  WITH ENDOSCOPIC ULTRASOUND AND FINE NEEDLE ASPIRATION performed by Lawanda Gary MD at [facility] ENDOSCOPY     Medications Prior to Admission:   No current facility-administered medications on file prior to encounter.      Current Outpatient Medications on File Prior to Encounter   Medication Sig Dispense Refill    oxyCODONE-acetaminophen (PERCOCET) 7.5-325 MG per tablet Take 1 tablet by mouth every 6 hours as needed for Pain.      lidocaine (XYLOCAINE) 5 % ointment       lidocaine-prilocaine (EMLA) 2.5-2.5 % cream       aspirin 81 MG chewable tablet Take 81 mg by mouth daily      amLODIPine (NORVASC) 5 MG tablet Take 5 mg by mouth daily      dorzolamide (TRUSOPT) 2 % ophthalmic solution INSTILL 1 DROP IN BOTH EYES TWICE DAILY      DULoxetine (CYMBALTA) 30 MG extended release capsule Take 30 mg by mouth daily      benazepril (LOTENSIN) 40 MG tablet Take 40 mg by mouth daily      labetalol (NORMODYNE) 100 MG tablet Take 100 mg by mouth 2 times daily      latanoprost (XALATAN) 0.005 % ophthalmic solution 1 drop nightly      flecainide (TAMBOCOR) 50 MG tablet Take 50 mg by mouth 2 times daily      vitamin E 400 UNIT capsule Take 400 Units by mouth 3 times daily       Pentoxifylline (TRENTAL PO) Take 400 mg by mouth three times daily       triamterene-hydrochlorothiazide (MAXZIDE-25) 37.5-25 MG per tablet Take 1 tablet by mouth daily. Probiotic Product (PROBIOTIC PO) Take  by mouth. Allergies:  Hydrocodone and Atorvastatin      Social History:   Social History     Tobacco Use    Smoking status: Never    Smokeless tobacco: Never   Substance Use Topics    Alcohol use: Yes     Comment: occassionally     Family History:   Family History   Problem Relation Age of Onset    Cancer Mother     Cancer Father        PHYSICAL EXAM:      BP (!) 193/66   Pulse 67   Temp 98.2 °F (36.8 °C) (Temporal)   Resp 18   Ht 5' 1\" (1.549 m)   Wt 138 lb (62.6 kg)   SpO2 97%   BMI 26.07 kg/m²  I        Heart:   within normal limits    Lungs:  CTA bilat anteriorly,  Normal effort    Abdomen:   soft, NT, ND      ASA Grade:  ASA 3 - Patient with moderate systemic disease with functional limitations    Mallampati Class: 2      ASSESSMENT AND PLAN:    1. Patient is a 80 y.o. female here for ERCP   2. Procedure options, risks and benefits reviewed with patient. Patient expresses understanding and wishes to proceed.     Ernie Rios MD,   [address]  1/30/2023    Please note that some or all of this record was generated using voice recognition software. If there are any questions about the content of this document, please contact the author as some errors in translation may have occurred.

## 2023-01-30 NOTE — PROGRESS NOTES
Cristal Vasquez in [place] states she will give the indocin suppository and he has in her department. Attempt to call Dr Jenny Howard about BP. No answer Shannan Harper is calling Dr Felicity Ogden to inform.

## 2023-01-30 NOTE — PROCEDURES
ERCP Note    Patient: Jose Manuel Becerra  : 1934  CSN: [id number]    Procedure: ERCP with sphincterotomy, Lithotripsy, balloon pull through and biliary stent placement. Date:  2023     Surgeon:  Rico Roy MD     Referring Physician:  Merrill Ferrell MD    Preoperative Diagnosis:  Choledocholithiasis [K80.50]    Postoperative Diagnosis:  * No post-op diagnosis entered *    Anesthesia:  MAC    EBL: minimal to none. Indications: This is a 80y.o. year old female who presents today with choledocholithiasis. Description of Procedure:  Informed consent was obtained from the patient after explanation of indications, benefits and possible risks and complications of the procedure. The patient was then taken to the endoscopy suite, placed in the left lateral decubitus position and the above IV sedation was administrered. The Olympus video endoscope was passed through the hypopharynx into the esophagus. The scope was advanced all the way to the second portion of the duodenum. The GE junction was at approximately 38 cms. The scope was slowly withdrawn and mucosa was carefully evaluated including a retroflex view of the proximal stomach. Major ampulla was noted to be within a small periampullary diverticulum. Using a sphincterotome loaded with a point [address], free cannulation of the common bile duct was achieved on our second attempt. Cholangiogram was obtained that showed multiple large stones in the proximal common bile duct. We were able to advance the central dome past the stones to the bifurcation of the bile duct and we were able to flush the stones down into the distal common bile duct. We then performed a generous sphincterotomy and a gush of bile was noted. The central tome was exchanged for a microinvasive lithotripsy basket and the stones were grasped and crushed successively.   Thereafter, the lithotripsy basket was exchanged for a 12 mm balloon catheter and multiple balloon pull-through's were performed and numerous stones/sludge/stone particles were removed from the common bile duct. We then removed the balloon catheter and reintroduced the sphincterotome and a cholangiogram was done that revealed complete clearing of the bile duct of all large stones. Small retained stone fragments and sludge cannot be ruled out. Thereafter using standard technique and over the wire [address] [place], 5 cm double-pigtail catheter was placed in the distal common bile duct extending into the duodenal lumen. All wires and catheters were then removed. The pancreatic duct was neither cannulated or opacified. Patient tolerated procedure well. Findings and interventions are described below. The patient was decompressed and the scope was then withdrawn. Gastric or Duodenal ulcer present: No    The patient tolerated the procedure well and was taken to the post anesthesia care unit in good condition. There were no immediate complications. Impression:    Choledocholithiasis-multiple large stones and a dilated bile duct-removed. Biliary stent placement. Recommendations:   Patient was given Cipro 400 milligrams IV prior to the procedure. Cipro 500 mg orally 2 times a day for 3 days is recommended. ERCP will be repeated in the next 4 to 6 weeks to remove any residual stones and sludge and also to remove the biliary stent. India Choe MD, MD  [address]    Please note that some or all of this record was generated using voice recognition software. If there are any questions about the content of this document, please contact the author as some errors in translation may have occurred.

## 2023-01-30 NOTE — ANESTHESIA PRE PROCEDURE
Department of Anesthesiology  Preprocedure Note       Name:  Antonio Guillermo   Age:  80 y.o.  :  1934                                          MRN:  [id number]         Date:  2023      Surgeon: Panda Smith):  Ernie Olivas MD    Procedure: Procedure(s):  ENDOSCOPIC RETROGRADE CHOLANGIOPANCREATOGRAPHY    Medications prior to admission:   Prior to Admission medications    Medication Sig Start Date End Date Taking? Authorizing Provider   oxyCODONE-acetaminophen (PERCOCET) 7.5-325 MG per tablet Take 1 tablet by mouth every 4 hours as needed for Pain. Historical Provider, MD   solifenacin (VESICARE) 5 MG tablet Take 1 tablet by mouth daily 22  Rita Prince MD   ibuprofen (ADVIL;MOTRIN) 600 MG tablet Take 1 tablet by mouth once for 1 dose Take 1 hour prior to Botox procedure. 22  Rita Prince MD   lidocaine (XYLOCAINE) 5 % ointment  5/3/22   Historical Provider, MD   lidocaine-prilocaine (EMLA) 2.5-2.5 % cream  22   Historical Provider, MD   predniSONE (DELTASONE) 5 MG tablet TAKE 1 TABLET BY MOUTH DAILY 3/21/22   Historical Provider, MD   pregabalin (LYRICA) 50 MG capsule TAKE 1 CAPSULE BY MOUTH THREE TIMES DAILY 3/22/22   Historical Provider, MD   aspirin 81 MG chewable tablet Take 81 mg by mouth daily    Historical Provider, MD   amLODIPine (NORVASC) 5 MG tablet Take 5 mg by mouth daily 10/18/21   Historical Provider, MD   dorzolamide (TRUSOPT) 2 % ophthalmic solution INSTILL 1 DROP IN BOTH EYES TWICE DAILY 10/17/21   Historical Provider, MD   zolpidem (AMBIEN) 10 MG tablet Take 10 mg by mouth as needed.  21   Historical Provider, MD   DULoxetine (CYMBALTA) 30 MG extended release capsule Take 30 mg by mouth daily    Historical Provider, MD   benazepril (LOTENSIN) 40 MG tablet Take 40 mg by mouth daily    Historical Provider, MD   labetalol (NORMODYNE) 100 MG tablet Take 100 mg by mouth 2 times daily    Historical Provider, MD   latanoprost (XALATAN) 0.005 % ophthalmic solution 1 drop nightly    Historical Provider, MD   flecainide (TAMBOCOR) 50 MG tablet Take 50 mg by mouth 2 times daily    Historical Provider, MD   pravastatin (PRAVACHOL) 10 MG tablet Take 10 mg by mouth daily     Historical Provider, MD   vitamin E 400 UNIT capsule Take 400 Units by mouth 3 times daily     Historical Provider, MD   Pentoxifylline (TRENTAL PO) Take 400 mg by mouth three times daily     Historical Provider, MD   triamterene-hydrochlorothiazide (MAXZIDE-25) 37.5-25 MG per tablet Take 1 tablet by mouth daily.      Historical Provider, MD   Probiotic Product (PROBIOTIC PO) Take  by mouth.      Historical Provider, MD       Current medications:    No current facility-administered medications for this encounter.       Allergies:    Allergies   Allergen Reactions   • Hydrocodone Itching and Nausea Only     Itching, nausea  Itching, nausea  Itching, nausea   • Atorvastatin      Other reaction(s): Muscle Aches  Other reaction(s): Muscle Aches  Other reaction(s): Other (See Comments)  Muscle ache and fatigue  Other reaction(s): Myalgia  Other reaction(s): Muscle Aches  Muscle ache and fatigue  Other reaction(s): Muscle Aches  Other reaction(s): Muscle Aches       Problem List:    Patient Active Problem List   Diagnosis Code   • Atheroscler of native artery of both legs with intermit claudication ([facility]) I70.213   • Bilateral carpal tunnel syndrome G56.03   • Chronic pain disorder G89.4   • Chronic rhinitis J31.0   • CKD (chronic kidney disease) stage 3, GFR 30-59 ml/min (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 L59.8, Y84.2    Reflux esophagitis K21.00    Sensorineural hearing loss H90.5    Squamous cell carcinoma of vagina ([address] C52    TIA (transient ischemic attack) G45.9    Urge incontinence of urine N39.41    Urinary incontinence R32    Chronic bilateral low back pain with left-sided sciatica M54.42, G89.29       Past Medical History:        Diagnosis Date    Atrial fibrillation (HCC)     Cancer ([address]     vaginal w/radiation and chemo    Hyperlipidemia     Hypertension     PONV (postoperative nausea and vomiting)        Past Surgical History:        Procedure Laterality Date    APPENDECTOMY      BLADDER SURGERY      bladder stimulator    BREAST REDUCTION SURGERY  1988    FOOT SURGERY  2011    left foot    HYSTERECTOMY (CERVIX STATUS UNKNOWN)      OTHER SURGICAL HISTORY      watchman device    SHOULDER ARTHROSCOPY      bilateral    TONSILLECTOMY      UPPER GASTROINTESTINAL ENDOSCOPY N/A 11/22/2019    EGD BIOPSY performed by Demetrius Valdez MD at [address] [place] [address] N/A 12/15/2022    ESOPHAGOGASTRODUODENOSCOPY  WITH ENDOSCOPIC ULTRASOUND AND FINE NEEDLE ASPIRATION performed by Ernie Encinas MD at [facility] ENDOSCOPY       Social History:    Social History     Tobacco Use    Smoking status: Never    Smokeless tobacco: Never   Substance Use Topics    Alcohol use: Yes     Comment: occassionally                                Counseling given: Not Answered      Vital Signs (Current): There were no vitals filed for this visit.                                            BP Readings from Last 3 Encounters:   12/15/22 (!) 153/86   06/14/22 93/61   05/12/22 (!) 186/81       NPO Status: Time of last liquid consumption: 2300                        Time of last solid consumption: 2300                                                      BMI:   Wt Readings from Last 3 Encounters:   12/15/22 144 lb (65.3 kg)   11/22/19 160 lb (72.6 kg)   08/03/16 150 lb (68 kg)     There is no height or weight on file to calculate BMI.    CBC:   Lab Results   Component Value Date/Time    WBC 7.5 12/15/2022 01:26 PM    RBC 3.97 12/15/2022 01:26 PM    HGB 11.9 12/15/2022 01:26 PM    HCT 35.8 12/15/2022 01:26 PM    MCV 90.3 12/15/2022 01:26 PM    RDW 13.5 12/15/2022 01:26 PM     12/15/2022 01:26 PM       CMP:   Lab Results   Component Value Date/Time     12/15/2022 01:26 PM    K 4.6 12/15/2022 01:26 PM     12/15/2022 01:26 PM    CO2 25 12/15/2022 01:26 PM    BUN 20 12/15/2022 01:26 PM    CREATININE 1.0 12/15/2022 01:26 PM    GFRAA 52 12/08/2016 06:22 AM    AGRATIO 1.4 12/15/2022 01:26 PM    LABGLOM 54 12/15/2022 01:26 PM    GLUCOSE 112 12/15/2022 01:26 PM    PROT 6.3 12/15/2022 01:26 PM    CALCIUM 10.0 12/15/2022 01:26 PM    BILITOT <0.2 12/15/2022 01:26 PM    ALKPHOS 86 12/15/2022 01:26 PM    AST 20 12/15/2022 01:26 PM    ALT 7 12/15/2022 01:26 PM       POC Tests: No results for input(s): POCGLU, POCNA, POCK, POCCL, POCBUN, POCHEMO, POCHCT in the last 72 hours. Coags:   Lab Results   Component Value Date/Time    PROTIME 14.0 12/15/2022 01:26 PM    INR 1.08 12/15/2022 01:26 PM       HCG (If Applicable): No results found for: PREGTESTUR, PREGSERUM, HCG, HCGQUANT     ABGs: No results found for: PHART, PO2ART, SFM4ZYM, AGZ6GHG, BEART, B0WPXSHC     Type & Screen (If Applicable):  No results found for: LABABO, LABRH    Drug/Infectious Status (If Applicable):  No results found for: HIV, HEPCAB    COVID-19 Screening (If Applicable): No results found for: COVID19        Anesthesia Evaluation  Patient summary reviewed and Nursing notes reviewed   history of anesthetic complications: PONV.   Airway: Mallampati: II  TM distance: >3 FB   Neck ROM: full  Mouth opening: > = 3 FB   Dental: normal exam         Pulmonary: breath sounds clear to auscultation      (-) not a current smoker (never)                           Cardiovascular:  Exercise tolerance: good (>4 METS),   (+) hypertension: moderate, dysrhythmias: atrial fibrillation,       NYHA Classification: II    Rhythm: regular  Rate: normal           Beta Blocker:  Not on Beta Blocker      ROS comment: Watchman  5 yrs ago for afib  Doing well       Neuro/Psych:   (+) TIA (2013  on asa   ),             GI/Hepatic/Renal:   (+) GERD: well controlled,           Endo/Other:    (+) malignancy/cancer (2015  vaginal ca  had chemo/radiation   chronic pain:  ). Abdominal:             Vascular: Other Findings:           Anesthesia Plan      general     ASA 3       Induction: intravenous. MIPS: Prophylactic antiemetics administered. Anesthetic plan and risks discussed with patient. Plan discussed with CRNA.     Attending anesthesiologist reviewed and agrees with Preprocedure content                Naomi Bermudez DO   1/30/2023

## 2023-01-30 NOTE — DISCHARGE INSTRUCTIONS
Cipro 500 mg orally once daily for 5 days. Many large stones were removed from the bile duct. ERCP will be repeated in 4-6 weeks. ENDOSCOPY DISCHARGE INSTRUCTIONS:    Call the physician that did your procedure for any questions or concern:    [facility]: [phone]  DR. NANCY TALAMANTES      ACTIVITY:    There are potential side effects to the medications used for sedation and anesthesia during your procedure. These include:  Dizziness or light-headedness, confusion or memory loss, delayed reaction times, loss of coordination, nausea and vomiting. Because of your increased risk for injury, we ask that you observe the following precautions: For the next 24 hours,  DO NOT operate an automobile, bicycle, motorcycle, , power tools or large equipment of any kind. Do not drink alcohol, sign any legal documents or make any legal decisions for 24 hours. Do not bend your head over lower than your heart. DO sit on the side of bed/couch awhile before getting up. Plan on bedrest or quiet relaxation today. You may resume normal activities in 24 hours. DIET:    Your first meal today should be light, avoiding spicy and fatty foods. If you tolerate this first meal, then you may advance to your regular diet unless otherwise advised by your physician. NORMAL SYMPTOMS:  -Mild sore throat if youve had an EGD   -Gaseous discomfort    NOTIFY YOUR PHYSICIAN IF THESE SYMPTOMS OCCUR:  1. Fever (greater than 100)  5. Increased abdominal bloating  2. Severe pain    6. Excessive bleeding  3. Nausea and vomiting  7. Chest pain                                                                    4. Chills    8. Shortness of breath    ADDITIONAL INSTRUCTIONS:    Biopsy results: Call [address] for biopsy results in 1 week    Educational Information:          Please review these discharge instructions this evening or tomorrow for  information you may have forgotten.             We want to thank you for choosing the [facility] as your health care provider. We always strive to provide you with excellent care while you are here. You may receive a survey in the mail regarding your care. We would appreciate you taking a few minutes of your time to complete this survey. [address]    There are potential side effects of anesthesia or sedation you may experience for the first 24 hours. These side effects include:    Confusion or Memory loss, Dizziness, or Delayed Reaction Times   [x]A responsible person should be with you for the next 24 hours. Do not operate any vehicles (automobiles, bicycles, motorcycles) or power tools or machinery for 24 hours. Do not sign any legal documents or make any legal decisions for 24 hours. Do not drink alcohol for 24 hours or while taking narcotic pain medication. Nausea    [x]Start with light diet and progress to your normal diet as you feel like eating. However, if you experience nausea or repeated episodes of vomiting which persist beyond 12-24 hours, notify your physician. Once nausea has passed, remember to keep drinking fluids. Difficulty Passing Urine  [x]Drink extra amounts of fluid today. Notify your physician if you have not urinated within 8 hours after your procedure or you feel uncomfortable. Irritated Throat from a Breathing Tube  [x]Drink extra amounts of fluid today. Lozenges may help. Muscle Aches  [x]You may experience some generalized body aches as your muscles recover from medications used to relax them during surgery. These will gradually subside. MEDICATION INSTRUCTIONS:  [x]Prescription(S) x   0  sent with you. Use as directed. When taking pain medications, you may experience the side effect of dizziness or drowsiness. Do not drink alcohol or drive when taking these medications.   []Prescription(S) x          Called to Pharmacy Name and location:    [x]Give the list of your medications to your primary care physician on your next visit. Keep your med list updated and carry it with in case of emergencies. [] Narcotic pain medications can cause the side effect of significant constipation. You may want to add a stool softener to your postoperative medication schedule or speak to your surgeon on how best to manage this side effect. NARCOTIC SAFETY:  Your pain medicine is only for you to take. Safely store your medicines. Store pills up high and out of reach of children and pets. Ensure safety caps are snapped tightly  Keep track of how many pills you have left    Unused medication can be disposed of by taking them to a drop-off box or take-back program that is authorized by the [facility]. Access to a site near you can be found on the [facility] website ([address] [employer]). If you have a CPAP machine, it is very important that you use it daily during all periods of sleep and daytime rest during your recovery at home. Surgery and Anesthesia place a significant amount of stress on your body. Using your CPAP will help keep you safe and lessen the negative effects of that stress. FOLLOW-UP RECOVERY CARE:  [x]Call the office for follow-up appointment and problems    Watch for these possible complications, symptoms, or side effects of anesthesia. Call physician if they or any other problems occur:  Unrelieved PAIN  Unrelieved NAUSEA  Inability to urinate            Physician:  Dr Clement Rios    The above instructions were reviewed with patient/significant other.   The following additional patient specific information was reviewed with the patient/significant other:  [x]Procedure/physician specific instructions  []Medication information sheet(S) including potential side effects  []Yolandas egress test  []Pain Ball management  []FAQ Catheter associated blood stream infections  []FAQ Surgical Site Infections  []Other-    I have read and understand the instructions given to me: ____________________________________________   (Patient/S.O. Signature)            Date/time 1/30/2023 3:21 PM         PACU:  [phone]   M-F 700 AM - 7 PM      SAME DAY SERVICES:  [phone] M-F 7AM-6PM        If you smoke STOP. We care about your health!

## 2023-01-30 NOTE — PROGRESS NOTES
PACU Transfer to [facility]    Vitals:    01/30/23 1530   BP: (!) 166/97   Pulse: 72   Resp: 20   Temp: 97.4 °F (36.3 °C)   SpO2:    BP improved from pre-op 200/71      Intake/Output Summary (Last 24 hours) at 1/30/2023 1537  Last data filed at 1/30/2023 1530  Gross per 24 hour   Intake 1234 ml   Output 0 ml   Net 1234 ml       Pain assessment:  none      Patient transferred to care of MARIPOSA RN.    1/30/2023 3:37 PM

## 2023-01-30 NOTE — PROGRESS NOTES
Eyes watering. Reminded not to touch her eyes. Right eye red. Asked if bothering her. Reports left eye bothering her. Both eyes flushed gently with NS. No pain or itching - just can't open. Cool wet cloth covering eyes. Dr Nina Padilla to see her.

## 2025-04-09 RX ORDER — LANOLIN ALCOHOL/MO/W.PET/CERES
1000 CREAM (GRAM) TOPICAL DAILY
COMMUNITY

## 2025-04-09 RX ORDER — ROSUVASTATIN CALCIUM 10 MG/1
10 TABLET, COATED ORAL DAILY
COMMUNITY
Start: 2025-01-10

## 2025-04-09 NOTE — PROGRESS NOTES
4/9 @ 9338 Spoke to Tacho @ PCP Dr. Trejo's office [phone] to clarify if EKG referred to from 3/18 PreOp note was done in addition to the CE 2/21 EKG from Dr. Rene's office [phone]. Requested 2/21 & any additional EKG tracings to be faxed to PAT# given with read back. MD     4/9 @ 8584 Abnormal labs from 3/18 & 3/26 CE faxed to Dr. Santos's office. Spoke with Cynthia @ Dr. Santos's office & confirms will be faxing 3/5 Cardiologist Dr. Rene [phone] Cardiac Clearance to PAT# given with read back. MD    4/10 @ 6563 Spoke to Saniya from Dr. Trejo's office & confirms EKG mentioned in 3/18 PreOp note was referring to 2/21 EKG from Dr. Rene's office & no additional EKG's were performed. MD    4/10 @ 2141 Spoke to Cynthia @ Dr. Santos's office & confirms will resend 3/5 Cardiologist Dr. Rene Cardiac Clearance to PAT# given with read back. MD

## 2025-04-09 NOTE — PROGRESS NOTES
[facility] PRE-SURGICAL TESTING INSTRUCTIONS                      PRIOR TO PROCEDURE DATE:    1. PLEASE FOLLOW ANY INSTRUCTIONS GIVEN TO YOU PER YOUR SURGEON.      2. Arrange for someone to drive you home and be with you for the first 24 hours after discharge for your safety after your procedure for which you received sedation. Ensure it is someone we can share information with regarding your discharge.     NOTE: At this time ONLY 2 ADULTS may accompany you   One person ENCOURAGED to stay at hospital entire time if outpatient surgery      3. You must contact your surgeon for instructions IF:  You are taking any blood thinners, aspirin, anti-inflammatory or vitamins.  There is a change in your physical condition such as a cold, fever, rash, cuts, sores, or any other infection, especially near your surgical site.    4. Do not drink alcohol the day before or day of your procedure.  Do not use any recreational marijuana at least 24 hours or street drugs (heroin, cocaine) at minimum 5 days prior to your procedure.     5. A Pre-Surgical History and Physical MUST be completed WITHIN 30 DAYS OR LESS prior to your procedure.by your Physician or an Urgent Care        THE DAY OF YOUR PROCEDURE:  1.  Follow instructions for ARRIVAL TIME as DIRECTED BY YOUR SURGEON.     2. Enter the MAIN entrance from [facility] and follow the signs to the free Parking Garage or  Parking (offered free of charge 7 am-5pm).      3. Enter the Main Entrance of the hospital (do not enter from the lower level of the parking garage). Upon entrance, check in with the  at the surgical information desk on your LEFT.   Bring your insurance card and photo ID to register      4. DO NOT EAT ANYTHING 8 hours prior to arrival for surgery.  You may have up to 8 ounces of water 4 hours prior to your arrival for surgery.   NOTE: ALL Gastric, Bariatric & Bowel surgery patients - you MUST follow your surgeon's instructions regarding

## 2025-04-14 ENCOUNTER — APPOINTMENT (OUTPATIENT)
Dept: GENERAL RADIOLOGY | Age: 89
End: 2025-04-14
Attending: STUDENT IN AN ORGANIZED HEALTH CARE EDUCATION/TRAINING PROGRAM
Payer: MEDICARE

## 2025-04-14 ENCOUNTER — HOSPITAL ENCOUNTER (OUTPATIENT)
Age: 89
Setting detail: OBSERVATION
LOS: 1 days | Discharge: HOME | End: 2025-04-15
Attending: STUDENT IN AN ORGANIZED HEALTH CARE EDUCATION/TRAINING PROGRAM | Admitting: STUDENT IN AN ORGANIZED HEALTH CARE EDUCATION/TRAINING PROGRAM
Payer: MEDICARE

## 2025-04-14 ENCOUNTER — ANESTHESIA EVENT (OUTPATIENT)
Dept: OPERATING ROOM | Age: 89
End: 2025-04-14
Payer: MEDICARE

## 2025-04-14 ENCOUNTER — ANESTHESIA (OUTPATIENT)
Dept: OPERATING ROOM | Age: 89
End: 2025-04-14
Payer: MEDICARE

## 2025-04-14 PROBLEM — N32.81 OVERACTIVE BLADDER: Status: ACTIVE | Noted: 2025-04-14

## 2025-04-14 PROCEDURE — 2500000003 HC RX 250 WO HCPCS: Performed by: STUDENT IN AN ORGANIZED HEALTH CARE EDUCATION/TRAINING PROGRAM

## 2025-04-14 PROCEDURE — 7100000001 HC PACU RECOVERY - ADDTL 15 MIN: Performed by: STUDENT IN AN ORGANIZED HEALTH CARE EDUCATION/TRAINING PROGRAM

## 2025-04-14 PROCEDURE — 3600000004 HC SURGERY LEVEL 4 BASE: Performed by: STUDENT IN AN ORGANIZED HEALTH CARE EDUCATION/TRAINING PROGRAM

## 2025-04-14 PROCEDURE — 3700000000 HC ANESTHESIA ATTENDED CARE: Performed by: STUDENT IN AN ORGANIZED HEALTH CARE EDUCATION/TRAINING PROGRAM

## 2025-04-14 PROCEDURE — 7100000000 HC PACU RECOVERY - FIRST 15 MIN: Performed by: STUDENT IN AN ORGANIZED HEALTH CARE EDUCATION/TRAINING PROGRAM

## 2025-04-14 PROCEDURE — 6360000002 HC RX W HCPCS: Performed by: STUDENT IN AN ORGANIZED HEALTH CARE EDUCATION/TRAINING PROGRAM

## 2025-04-14 PROCEDURE — 2709999900 HC NON-CHARGEABLE SUPPLY: Performed by: STUDENT IN AN ORGANIZED HEALTH CARE EDUCATION/TRAINING PROGRAM

## 2025-04-14 PROCEDURE — 2580000003 HC RX 258: Performed by: ANESTHESIOLOGY

## 2025-04-14 PROCEDURE — 6360000002 HC RX W HCPCS

## 2025-04-14 PROCEDURE — 3600000014 HC SURGERY LEVEL 4 ADDTL 15MIN: Performed by: STUDENT IN AN ORGANIZED HEALTH CARE EDUCATION/TRAINING PROGRAM

## 2025-04-14 PROCEDURE — 6370000000 HC RX 637 (ALT 250 FOR IP): Performed by: STUDENT IN AN ORGANIZED HEALTH CARE EDUCATION/TRAINING PROGRAM

## 2025-04-14 PROCEDURE — 2500000003 HC RX 250 WO HCPCS

## 2025-04-14 PROCEDURE — C1769 GUIDE WIRE: HCPCS | Performed by: STUDENT IN AN ORGANIZED HEALTH CARE EDUCATION/TRAINING PROGRAM

## 2025-04-14 PROCEDURE — G0378 HOSPITAL OBSERVATION PER HR: HCPCS

## 2025-04-14 PROCEDURE — C1894 INTRO/SHEATH, NON-LASER: HCPCS | Performed by: STUDENT IN AN ORGANIZED HEALTH CARE EDUCATION/TRAINING PROGRAM

## 2025-04-14 PROCEDURE — 3700000001 HC ADD 15 MINUTES (ANESTHESIA): Performed by: STUDENT IN AN ORGANIZED HEALTH CARE EDUCATION/TRAINING PROGRAM

## 2025-04-14 PROCEDURE — 94150 VITAL CAPACITY TEST: CPT

## 2025-04-14 PROCEDURE — 2580000003 HC RX 258: Performed by: STUDENT IN AN ORGANIZED HEALTH CARE EDUCATION/TRAINING PROGRAM

## 2025-04-14 PROCEDURE — C1758 CATHETER, URETERAL: HCPCS | Performed by: STUDENT IN AN ORGANIZED HEALTH CARE EDUCATION/TRAINING PROGRAM

## 2025-04-14 RX ORDER — CEFDINIR 300 MG/1
300 CAPSULE ORAL 2 TIMES DAILY
Status: ON HOLD | COMMUNITY
Start: 2025-04-10 | End: 2025-04-15 | Stop reason: HOSPADM

## 2025-04-14 RX ORDER — LISINOPRIL 40 MG/1
40 TABLET ORAL DAILY
Status: DISCONTINUED | OUTPATIENT
Start: 2025-04-15 | End: 2025-04-15 | Stop reason: HOSPADM

## 2025-04-14 RX ORDER — SODIUM CHLORIDE, SODIUM LACTATE, POTASSIUM CHLORIDE, CALCIUM CHLORIDE 600; 310; 30; 20 MG/100ML; MG/100ML; MG/100ML; MG/100ML
INJECTION, SOLUTION INTRAVENOUS CONTINUOUS
Status: DISCONTINUED | OUTPATIENT
Start: 2025-04-14 | End: 2025-04-14 | Stop reason: HOSPADM

## 2025-04-14 RX ORDER — FLECAINIDE ACETATE 50 MG/1
50 TABLET ORAL 2 TIMES DAILY
Status: DISCONTINUED | OUTPATIENT
Start: 2025-04-14 | End: 2025-04-15 | Stop reason: HOSPADM

## 2025-04-14 RX ORDER — AMLODIPINE BESYLATE 5 MG/1
5 TABLET ORAL DAILY
Status: DISCONTINUED | OUTPATIENT
Start: 2025-04-15 | End: 2025-04-15 | Stop reason: HOSPADM

## 2025-04-14 RX ORDER — SENNA AND DOCUSATE SODIUM 50; 8.6 MG/1; MG/1
1 TABLET, FILM COATED ORAL 2 TIMES DAILY
Status: DISCONTINUED | OUTPATIENT
Start: 2025-04-14 | End: 2025-04-15 | Stop reason: HOSPADM

## 2025-04-14 RX ORDER — SODIUM CHLORIDE 0.9 % (FLUSH) 0.9 %
5-40 SYRINGE (ML) INJECTION PRN
Status: DISCONTINUED | OUTPATIENT
Start: 2025-04-14 | End: 2025-04-15 | Stop reason: HOSPADM

## 2025-04-14 RX ORDER — ONDANSETRON 2 MG/ML
4 INJECTION INTRAMUSCULAR; INTRAVENOUS EVERY 6 HOURS PRN
Status: DISCONTINUED | OUTPATIENT
Start: 2025-04-14 | End: 2025-04-15 | Stop reason: HOSPADM

## 2025-04-14 RX ORDER — OXYCODONE HYDROCHLORIDE 5 MG/1
5 TABLET ORAL EVERY 4 HOURS PRN
Status: DISCONTINUED | OUTPATIENT
Start: 2025-04-14 | End: 2025-04-15 | Stop reason: HOSPADM

## 2025-04-14 RX ORDER — FUROSEMIDE 10 MG/ML
INJECTION INTRAMUSCULAR; INTRAVENOUS
Status: DISCONTINUED | OUTPATIENT
Start: 2025-04-14 | End: 2025-04-14 | Stop reason: SDUPTHER

## 2025-04-14 RX ORDER — SODIUM CHLORIDE 9 MG/ML
INJECTION, SOLUTION INTRAVENOUS PRN
Status: DISCONTINUED | OUTPATIENT
Start: 2025-04-14 | End: 2025-04-15 | Stop reason: HOSPADM

## 2025-04-14 RX ORDER — SODIUM CHLORIDE, SODIUM LACTATE, POTASSIUM CHLORIDE, CALCIUM CHLORIDE 600; 310; 30; 20 MG/100ML; MG/100ML; MG/100ML; MG/100ML
INJECTION, SOLUTION INTRAVENOUS CONTINUOUS
Status: DISCONTINUED | OUTPATIENT
Start: 2025-04-14 | End: 2025-04-15 | Stop reason: HOSPADM

## 2025-04-14 RX ORDER — DEXAMETHASONE SODIUM PHOSPHATE 4 MG/ML
INJECTION, SOLUTION INTRA-ARTICULAR; INTRALESIONAL; INTRAMUSCULAR; INTRAVENOUS; SOFT TISSUE
Status: DISCONTINUED | OUTPATIENT
Start: 2025-04-14 | End: 2025-04-14 | Stop reason: SDUPTHER

## 2025-04-14 RX ORDER — FENTANYL CITRATE 50 UG/ML
INJECTION, SOLUTION INTRAMUSCULAR; INTRAVENOUS
Status: DISCONTINUED | OUTPATIENT
Start: 2025-04-14 | End: 2025-04-14 | Stop reason: SDUPTHER

## 2025-04-14 RX ORDER — ONDANSETRON 2 MG/ML
INJECTION INTRAMUSCULAR; INTRAVENOUS
Status: DISCONTINUED | OUTPATIENT
Start: 2025-04-14 | End: 2025-04-14 | Stop reason: SDUPTHER

## 2025-04-14 RX ORDER — ROSUVASTATIN CALCIUM 10 MG/1
10 TABLET, COATED ORAL DAILY
Status: DISCONTINUED | OUTPATIENT
Start: 2025-04-14 | End: 2025-04-15 | Stop reason: HOSPADM

## 2025-04-14 RX ORDER — SODIUM CHLORIDE 0.9 % (FLUSH) 0.9 %
5-40 SYRINGE (ML) INJECTION EVERY 12 HOURS SCHEDULED
Status: DISCONTINUED | OUTPATIENT
Start: 2025-04-14 | End: 2025-04-15 | Stop reason: HOSPADM

## 2025-04-14 RX ORDER — PROPOFOL 10 MG/ML
INJECTION, EMULSION INTRAVENOUS
Status: DISCONTINUED | OUTPATIENT
Start: 2025-04-14 | End: 2025-04-14 | Stop reason: SDUPTHER

## 2025-04-14 RX ORDER — CEFDINIR 300 MG/1
300 CAPSULE ORAL 2 TIMES DAILY
Status: DISCONTINUED | OUTPATIENT
Start: 2025-04-14 | End: 2025-04-15 | Stop reason: HOSPADM

## 2025-04-14 RX ORDER — ROCURONIUM BROMIDE 10 MG/ML
INJECTION, SOLUTION INTRAVENOUS
Status: DISCONTINUED | OUTPATIENT
Start: 2025-04-14 | End: 2025-04-14 | Stop reason: SDUPTHER

## 2025-04-14 RX ORDER — SUCCINYLCHOLINE/SOD CL,ISO/PF 200MG/10ML
SYRINGE (ML) INTRAVENOUS
Status: DISCONTINUED | OUTPATIENT
Start: 2025-04-14 | End: 2025-04-14 | Stop reason: SDUPTHER

## 2025-04-14 RX ORDER — PHENYLEPHRINE HYDROCHLORIDE 10 MG/ML
INJECTION INTRAVENOUS
Status: DISCONTINUED | OUTPATIENT
Start: 2025-04-14 | End: 2025-04-14 | Stop reason: SDUPTHER

## 2025-04-14 RX ORDER — TRIAMTERENE AND HYDROCHLOROTHIAZIDE 37.5; 25 MG/1; MG/1
1 TABLET ORAL DAILY
Status: DISCONTINUED | OUTPATIENT
Start: 2025-04-15 | End: 2025-04-15 | Stop reason: HOSPADM

## 2025-04-14 RX ORDER — LABETALOL 100 MG/1
100 TABLET, FILM COATED ORAL 2 TIMES DAILY
Status: DISCONTINUED | OUTPATIENT
Start: 2025-04-14 | End: 2025-04-15 | Stop reason: HOSPADM

## 2025-04-14 RX ORDER — DORZOLAMIDE HCL 20 MG/ML
1 SOLUTION/ DROPS OPHTHALMIC NIGHTLY
Status: DISCONTINUED | OUTPATIENT
Start: 2025-04-14 | End: 2025-04-15 | Stop reason: HOSPADM

## 2025-04-14 RX ORDER — ONDANSETRON 4 MG/1
4 TABLET, ORALLY DISINTEGRATING ORAL EVERY 8 HOURS PRN
Status: DISCONTINUED | OUTPATIENT
Start: 2025-04-14 | End: 2025-04-15 | Stop reason: HOSPADM

## 2025-04-14 RX ORDER — LIDOCAINE HYDROCHLORIDE 20 MG/ML
INJECTION, SOLUTION INTRAVENOUS
Status: DISCONTINUED | OUTPATIENT
Start: 2025-04-14 | End: 2025-04-14 | Stop reason: SDUPTHER

## 2025-04-14 RX ORDER — ACETAMINOPHEN 500 MG
1000 TABLET ORAL EVERY 8 HOURS SCHEDULED
Status: DISCONTINUED | OUTPATIENT
Start: 2025-04-14 | End: 2025-04-15 | Stop reason: HOSPADM

## 2025-04-14 RX ORDER — LATANOPROST 50 UG/ML
1 SOLUTION/ DROPS OPHTHALMIC NIGHTLY
Status: DISCONTINUED | OUTPATIENT
Start: 2025-04-14 | End: 2025-04-15 | Stop reason: HOSPADM

## 2025-04-14 RX ADMIN — ONDANSETRON 4 MG: 2 INJECTION INTRAMUSCULAR; INTRAVENOUS at 13:21

## 2025-04-14 RX ADMIN — SUGAMMADEX 200 MG: 100 INJECTION, SOLUTION INTRAVENOUS at 14:15

## 2025-04-14 RX ADMIN — ACETAMINOPHEN 1000 MG: 500 TABLET ORAL at 21:54

## 2025-04-14 RX ADMIN — PHENYLEPHRINE HYDROCHLORIDE 100 MCG: 10 INJECTION, SOLUTION INTRAVENOUS at 13:14

## 2025-04-14 RX ADMIN — SODIUM CHLORIDE, SODIUM LACTATE, POTASSIUM CHLORIDE, AND CALCIUM CHLORIDE: .6; .31; .03; .02 INJECTION, SOLUTION INTRAVENOUS at 12:00

## 2025-04-14 RX ADMIN — ROSUVASTATIN CALCIUM 10 MG: 10 TABLET, FILM COATED ORAL at 22:03

## 2025-04-14 RX ADMIN — FENTANYL CITRATE 12.5 MCG: 50 INJECTION, SOLUTION INTRAMUSCULAR; INTRAVENOUS at 13:19

## 2025-04-14 RX ADMIN — FENTANYL CITRATE 25 MCG: 50 INJECTION, SOLUTION INTRAMUSCULAR; INTRAVENOUS at 14:24

## 2025-04-14 RX ADMIN — WATER 2000 MG: 1 INJECTION INTRAMUSCULAR; INTRAVENOUS; SUBCUTANEOUS at 13:21

## 2025-04-14 RX ADMIN — SODIUM CHLORIDE, SODIUM LACTATE, POTASSIUM CHLORIDE, AND CALCIUM CHLORIDE: .6; .31; .03; .02 INJECTION, SOLUTION INTRAVENOUS at 16:33

## 2025-04-14 RX ADMIN — FENTANYL CITRATE 25 MCG: 50 INJECTION, SOLUTION INTRAMUSCULAR; INTRAVENOUS at 13:42

## 2025-04-14 RX ADMIN — LABETALOL HYDROCHLORIDE 100 MG: 100 TABLET, FILM COATED ORAL at 21:55

## 2025-04-14 RX ADMIN — FUROSEMIDE 10 MG: 10 INJECTION, SOLUTION INTRAMUSCULAR; INTRAVENOUS at 14:22

## 2025-04-14 RX ADMIN — ACETAMINOPHEN 1000 MG: 500 TABLET ORAL at 16:31

## 2025-04-14 RX ADMIN — PROPOFOL 50 MG: 10 INJECTION, EMULSION INTRAVENOUS at 13:13

## 2025-04-14 RX ADMIN — SENNOSIDES, DOCUSATE SODIUM 1 TABLET: 50; 8.6 TABLET, FILM COATED ORAL at 21:55

## 2025-04-14 RX ADMIN — FLECAINIDE ACETATE 50 MG: 50 TABLET ORAL at 21:55

## 2025-04-14 RX ADMIN — ROCURONIUM BROMIDE 5 MG: 10 INJECTION, SOLUTION INTRAVENOUS at 13:13

## 2025-04-14 RX ADMIN — DEXAMETHASONE SODIUM PHOSPHATE 4 MG: 4 INJECTION INTRA-ARTICULAR; INTRALESIONAL; INTRAMUSCULAR; INTRAVENOUS; SOFT TISSUE at 13:21

## 2025-04-14 RX ADMIN — CEFDINIR 300 MG: 300 CAPSULE ORAL at 22:03

## 2025-04-14 RX ADMIN — ROCURONIUM BROMIDE 25 MG: 10 INJECTION, SOLUTION INTRAVENOUS at 13:23

## 2025-04-14 RX ADMIN — PROPOFOL 20 MG: 10 INJECTION, EMULSION INTRAVENOUS at 13:14

## 2025-04-14 RX ADMIN — Medication 100 MG: at 13:14

## 2025-04-14 RX ADMIN — LIDOCAINE HYDROCHLORIDE 100 MG: 20 INJECTION, SOLUTION INTRAVENOUS at 13:13

## 2025-04-14 RX ADMIN — LATANOPROST 1 DROP: 50 SOLUTION OPHTHALMIC at 22:03

## 2025-04-14 RX ADMIN — FENTANYL CITRATE 12.5 MCG: 50 INJECTION, SOLUTION INTRAMUSCULAR; INTRAVENOUS at 13:33

## 2025-04-14 RX ADMIN — ROCURONIUM BROMIDE 10 MG: 10 INJECTION, SOLUTION INTRAVENOUS at 13:39

## 2025-04-14 RX ADMIN — FENTANYL CITRATE 25 MCG: 50 INJECTION, SOLUTION INTRAMUSCULAR; INTRAVENOUS at 13:38

## 2025-04-14 ASSESSMENT — PAIN SCALES - GENERAL
PAINLEVEL_OUTOF10: 0

## 2025-04-14 ASSESSMENT — PAIN - FUNCTIONAL ASSESSMENT: PAIN_FUNCTIONAL_ASSESSMENT: 0-10

## 2025-04-14 ASSESSMENT — PAIN DESCRIPTION - DESCRIPTORS: DESCRIPTORS: BURNING;SHARP

## 2025-04-14 NOTE — ANESTHESIA PRE PROCEDURE
Department of Anesthesiology  Preprocedure Note       Name:  Amrita Ramos   Age:  89 y.o.  :  1934                                          MRN:  [id number]         Date:  2025      Surgeon: Surgeon(s):  Arpan Santos MD    Procedure: Procedure(s):  CYSTOSCOPY PELVIC EXAM UNDER ANESTHESIA POSSIBLE BOTOX INJECTION 100U POSSIBLE SUPRAPUBIC TUBE PLACEMENT  .    Medications prior to admission:   Prior to Admission medications    Medication Sig Start Date End Date Taking? Authorizing Provider   cefdinir (OMNICEF) 300 MG capsule Take 1 capsule by mouth 2 times daily Take 4 days prior to surgery 4/10/25  Yes Provider, MD Lashawn   vitamin B-12 (CYANOCOBALAMIN) 1000 MCG tablet Take 1 tablet by mouth daily   Yes Provider, MD Lashawn   rosuvastatin (CRESTOR) 10 MG tablet Take 1 tablet by mouth daily 1/10/25  Yes Provider, MD Lashawn   lidocaine-prilocaine (EMLA) 2.5-2.5 % cream  22  Yes Provider, MD Lashawn   aspirin 81 MG chewable tablet Take 1 tablet by mouth daily   Yes Provider, MD Lashawn   amLODIPine (NORVASC) 5 MG tablet Take 1 tablet by mouth daily 10/18/21  Yes Provider, MD Lashawn   dorzolamide (TRUSOPT) 2 % ophthalmic solution Place 1 drop into both eyes nightly 10/17/21  Yes Provider, MD Lashawn   benazepril (LOTENSIN) 40 MG tablet Take 1 tablet by mouth daily   Yes Provider, MD Lashawn   labetalol (NORMODYNE) 100 MG tablet Take 1 tablet by mouth 2 times daily   Yes Provider, MD Lashawn   latanoprost (XALATAN) 0.005 % ophthalmic solution Place 1 drop into both eyes nightly   Yes Provider, MD Lashawn   flecainide (TAMBOCOR) 50 MG tablet Take 1 tablet by mouth 2 times daily   Yes Provider, MD Lashawn   triamterene-hydrochlorothiazide (MAXZIDE-25) 37.5-25 MG per tablet Take 1 tablet by mouth daily   Yes Provider, MD Lashawn       Current medications:    Current Facility-Administered Medications   Medication Dose Route Frequency Provider Last Rate

## 2025-04-14 NOTE — PROGRESS NOTES
4 Eyes Skin Assessment     NAME:  Amrita Ramos  YOB: 1934  MEDICAL RECORD NUMBER:  [id number]    The patient is being assessed for  Admission    I agree that at least one RN has performed a thorough Head to Toe Skin Assessment on the patient. ALL assessment sites listed below have been assessed.      Areas assessed by both nurses:    Head, Face, Ears, Shoulders, Back, Chest, Arms, Elbows, Hands, Sacrum. Buttock, Coccyx, Ischium, Legs. Feet and Heels, and Under Medical Devices         Does the Patient have a Wound? No noted wound(s)       Sekou Prevention initiated by RN: No  Wound Care Orders initiated by RN: No    Pressure Injury (Stage 3,4, Unstageable, DTI, NWPT, and Complex wounds) if present, place Wound referral order by RN under : No    New Ostomies, if present place, Ostomy referral order under : No     Nurse 1 eSignature: Electronically signed by Susana Luis RN on 4/14/25 at 5:19 PM EDT    **SHARE this note so that the co-signing nurse can place an eSignature**    Nurse 2 eSignature: Electronically signed by Tri Melton RN on 4/14/25 at 5:20 PM EDT     Patient Amrita Ramos to room 5305 from PACU. Patient is A&O x 4. VSS. Patient oriented to the room all safety measures in place. Patient given IS and SCDs at this time. Admission orders released and patient 4 eyes completed. Admission documentation completed. No other needs are noted at this time.    [x] Bed alarm on and cord plugged into wall  [x] Bed in lowest position  [x] Call light and bedside table within reach  [x] Patient educated on all safety measures  []Oxygen connected to wall (if applicable)     Nurse 1 Esignature: Electronically signed by Susana Luis RN on 4/14/25 at 5:20 PM EDT  Nurse 2 Esignature: Electronically signed by Tri Melton RN on 4/14/25 at 5:21 PM EDT

## 2025-04-14 NOTE — INTERVAL H&P NOTE
Update History & Physical    The patient's History and Physical of March 18, 2025 was reviewed with the patient and I examined the patient. There was no change. The surgical site was confirmed by the patient and me.     Plan: The risks, benefits, expected outcome, and alternative to the recommended procedure have been discussed with the patient. Patient understands and wants to proceed with the procedure.     Electronically signed by Arpan Santos MD on 4/14/2025 at 1:00 PM

## 2025-04-14 NOTE — ANESTHESIA POSTPROCEDURE EVALUATION
Department of Anesthesiology  Postprocedure Note    Patient: Amrita Ramos  MRN: [id number]  YOB: 1934  Date of evaluation: 4/14/2025    Procedure Summary       Date: 04/14/25 Room / Location: [place] [address] / [facility]    Anesthesia Start: 1308 Anesthesia Stop: 1439    Procedure: SUPRAPUBIC TUBE PLACEMENT Diagnosis:       Overactive bladder      (Overactive bladder [N32.81])    Surgeons: Arpan Santos MD Responsible Provider: Alexander Gan MD    Anesthesia Type: general ASA Status: 3            Anesthesia Type: No value filed.    Crescencio Phase I: Crescencio Score: 9    Crescencio Phase II:      Anesthesia Post Evaluation    Patient location during evaluation: PACU  Patient participation: complete - patient participated  Level of consciousness: awake  Pain score: 2  Airway patency: patent  Cardiovascular status: blood pressure returned to baseline  Respiratory status: acceptable  Hydration status: euvolemic  Pain management: adequate    No notable events documented.

## 2025-04-14 NOTE — PROGRESS NOTES
PACU Transfer Note    Vitals:    04/14/25 1545   BP: (!) 156/66   Pulse: 68   Resp: 21   Temp: 97.6 °F (36.4 °C)   SpO2: 95%       In: 700 [I.V.:700]  Out: -     Pain assessment:  none  Pain Level: 0    Report given to Receiving unit RN at bedside in pacu. Pt awake, VSS. Denies need for pain meds. Denies nausea. Suprapubic catheter to gravity drainage. Urine light pink, no clots. (Irrigated one time in pacu). Vaginal packing removed as ordered prior to going to 5S. No vaginal bleeding noted. All belongings w/pt. Crescencio 9/10. Family updated.     4/14/2025 3:48 PM

## 2025-04-14 NOTE — OP NOTE
Operative Note      Patient: Amrita Ramos  YOB: 1934  MRN: [id number]    Date of Procedure: 4/14/2025    Pre-Op Diagnosis Codes:      * Overactive bladder [N32.81]  History of vaginal radiation    Post-Op Diagnosis: Same       Procedure(s):  EXAM UNDER ANESTHESIA, VAGINOSCOPY, SUPRAPUBIC TUBE PLACEMENT WITH ULTRASONIC GUIDANCE    Surgeon(s):  Arpan Santos MD    Assistant:   * No surgical staff found *    Anesthesia: General    Estimated Blood Loss (mL): Minimal    Complications: None    Specimens:   * No specimens in log *    Implants:  * No implants in log *      Drains: * No LDAs found *    Indications:   89 y.o. 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  She has subsequently developed significant overactive bladder.  She had an InterStim placed in 2020 that has subsequently failed.  She was first seen by Dr. Sprague in 2022 after her urogynecology's was unable to identify her urethra during a cystoscopy.  He also performs an EUA with an attempt at cystoscopy but was also unable to identify her urethra.  IR was unable to place a suprapubic catheter due to under distention of her bladder.  She had staged InterStim replacement on 2/8/23 with subsequent lead revision on 5/17/24 after KUB showed lead migration.  She now continues to have very bothersome urinary issues with very significant nocturia, urgency, and incontinence, now going through 14-16 diapers per day.  We discussed that we should try once again to identify her urethra and also rule out some sort of vesicovaginal fistula.  We discussed the possibilities of doing Botox injection should everything looked normal versus possibly trying to place a suprapubic catheter should we be unable to access her urethra.  She agreed to undergo the above named procedure after discussion of the alternatives, risks and benefits. Informed consent was obtained.     Findings:    exam revealed

## 2025-04-14 NOTE — PROGRESS NOTES
Brought to Pacu from OR. Report received from CRNA and OR RN. Pt easily arousable. Placed on monitors.VSS. Suprapubic catheter with light pink urine. No clots noted. Vaginal packing in place. Cont to monitor.

## 2025-04-14 NOTE — PROGRESS NOTES
Dr. Santos to bedside. Pt will be admitted overnight. Urine becoming more punchy red. Suprapubic catheter irrigated easily with NS. Cont to monitor.

## 2025-04-14 NOTE — BRIEF OP NOTE
Brief Postoperative Note      Patient: Amrita Ramos  YOB: 1934  MRN: [id number]    Date of Procedure: 4/14/2025    Pre-Op Diagnosis Codes:      * Overactive bladder [N32.81]    Post-Op Diagnosis: Same       Procedure(s):  SUPRAPUBIC TUBE PLACEMENT, Vaginoscopy    Surgeon(s):  Arpan Santos MD    Assistant:  * No surgical staff found *    Anesthesia: General    Estimated Blood Loss (mL): Minimal    Complications: None    Specimens:   * No specimens in log *    Implants:  * No implants in log *      Drains:   Suprapubic Catheter Double-lumen;Latex (Active)       Findings:  Infection Present At Time Of Surgery (PATOS) (choose all levels that have infection present):  No infection present  Other Findings: obliterated vagina/urethra. SPT placed with ultrasonic guidance    Electronically signed by Arpan Santos MD on 4/14/2025 at 2:56 PM

## 2025-04-14 NOTE — PROGRESS NOTES
-[facility] with daughter Iris  -Patient is resting in bed with call light.  -Antibiotic on hold to OR- Ancef on call to OR.  -Pt belongings bag- 2 bags to locked room. Daughter to take purse and glasses    Patient requesting to leave bilateral hearing aides in.     -IV patent w/ IV fluids running.    -Labs sent- none ordered  -Depends on. The patient leaks all the time. She cannot tell when she goes to the bathroom  -She has a bladder stimulator in (right back)- it is on but she cannot tell a difference. Dr. Santos aware that it is on.  -No issues with mobility.

## 2025-04-15 VITALS
BODY MASS INDEX: 27.55 KG/M2 | TEMPERATURE: 97.6 F | OXYGEN SATURATION: 94 % | HEIGHT: 61 IN | HEART RATE: 62 BPM | SYSTOLIC BLOOD PRESSURE: 132 MMHG | WEIGHT: 145.94 LBS | DIASTOLIC BLOOD PRESSURE: 65 MMHG | RESPIRATION RATE: 16 BRPM

## 2025-04-15 LAB
ANION GAP SERPL CALCULATED.3IONS-SCNC: 10 MMOL/L (ref 3–16)
BUN SERPL-MCNC: 33 MG/DL (ref 7–20)
CALCIUM SERPL-MCNC: 8.9 MG/DL (ref 8.3–10.6)
CHLORIDE SERPL-SCNC: 102 MMOL/L (ref 99–110)
CO2 SERPL-SCNC: 24 MMOL/L (ref 21–32)
CREAT SERPL-MCNC: 1.3 MG/DL (ref 0.6–1.2)
DEPRECATED RDW RBC AUTO: 14.8 % (ref 12.4–15.4)
GFR SERPLBLD CREATININE-BSD FMLA CKD-EPI: 39 ML/MIN/{1.73_M2}
GLUCOSE SERPL-MCNC: 133 MG/DL (ref 70–99)
HCT VFR BLD AUTO: 30.9 % (ref 36–48)
HGB BLD-MCNC: 10.1 G/DL (ref 12–16)
MCH RBC QN AUTO: 29.2 PG (ref 26–34)
MCHC RBC AUTO-ENTMCNC: 32.6 G/DL (ref 31–36)
MCV RBC AUTO: 89.6 FL (ref 80–100)
PLATELET # BLD AUTO: 186 K/UL (ref 135–450)
PMV BLD AUTO: 8.2 FL (ref 5–10.5)
POTASSIUM SERPL-SCNC: 4.3 MMOL/L (ref 3.5–5.1)
RBC # BLD AUTO: 3.45 M/UL (ref 4–5.2)
SODIUM SERPL-SCNC: 136 MMOL/L (ref 136–145)
WBC # BLD AUTO: 12.4 K/UL (ref 4–11)

## 2025-04-15 PROCEDURE — 85027 COMPLETE CBC AUTOMATED: CPT

## 2025-04-15 PROCEDURE — 2580000003 HC RX 258: Performed by: STUDENT IN AN ORGANIZED HEALTH CARE EDUCATION/TRAINING PROGRAM

## 2025-04-15 PROCEDURE — 6370000000 HC RX 637 (ALT 250 FOR IP): Performed by: STUDENT IN AN ORGANIZED HEALTH CARE EDUCATION/TRAINING PROGRAM

## 2025-04-15 PROCEDURE — 36415 COLL VENOUS BLD VENIPUNCTURE: CPT

## 2025-04-15 PROCEDURE — 80048 BASIC METABOLIC PNL TOTAL CA: CPT

## 2025-04-15 PROCEDURE — G0378 HOSPITAL OBSERVATION PER HR: HCPCS

## 2025-04-15 PROCEDURE — 2500000003 HC RX 250 WO HCPCS: Performed by: STUDENT IN AN ORGANIZED HEALTH CARE EDUCATION/TRAINING PROGRAM

## 2025-04-15 RX ORDER — CEFDINIR 300 MG/1
300 CAPSULE ORAL 2 TIMES DAILY
Qty: 10 CAPSULE | Refills: 0 | Status: SHIPPED | OUTPATIENT
Start: 2025-04-15 | End: 2025-04-20

## 2025-04-15 RX ADMIN — SODIUM CHLORIDE, SODIUM LACTATE, POTASSIUM CHLORIDE, AND CALCIUM CHLORIDE: .6; .31; .03; .02 INJECTION, SOLUTION INTRAVENOUS at 00:08

## 2025-04-15 RX ADMIN — TRIAMTERENE AND HYDROCHLOROTHIAZIDE 1 TABLET: 37.5; 25 TABLET ORAL at 08:01

## 2025-04-15 RX ADMIN — SODIUM CHLORIDE, PRESERVATIVE FREE 10 ML: 5 INJECTION INTRAVENOUS at 07:58

## 2025-04-15 RX ADMIN — FLECAINIDE ACETATE 50 MG: 50 TABLET ORAL at 07:57

## 2025-04-15 RX ADMIN — SENNOSIDES, DOCUSATE SODIUM 1 TABLET: 50; 8.6 TABLET, FILM COATED ORAL at 07:58

## 2025-04-15 RX ADMIN — LISINOPRIL 40 MG: 40 TABLET ORAL at 07:57

## 2025-04-15 RX ADMIN — ACETAMINOPHEN 1000 MG: 500 TABLET ORAL at 05:33

## 2025-04-15 RX ADMIN — LABETALOL HYDROCHLORIDE 100 MG: 100 TABLET, FILM COATED ORAL at 07:57

## 2025-04-15 RX ADMIN — AMLODIPINE BESYLATE 5 MG: 5 TABLET ORAL at 07:58

## 2025-04-15 RX ADMIN — CEFDINIR 300 MG: 300 CAPSULE ORAL at 08:01

## 2025-04-15 NOTE — PROGRESS NOTES
Urology Attending Progress Note      Subjective: No  complaints. Denies pain, no issues with drainage of SPT overnight.    Vitals:  BP (!) 143/60   Pulse 69   Temp 97.7 °F (36.5 °C) (Oral)   Resp 19   Ht 1.549 m (5' 0.98\")   Wt 66.2 kg (145 lb 15.1 oz)   SpO2 94%   BMI 27.59 kg/m²   Temp  Av.8 °F (36.6 °C)  Min: 97 °F (36.1 °C)  Max: 98.3 °F (36.8 °C)    Intake/Output Summary (Last 24 hours) at 4/15/2025 0920  Last data filed at 4/15/2025 0755  Gross per 24 hour   Intake 970 ml   Output 1150 ml   Net -180 ml       Exam: Urine yellow in catheter tubing    Labs:  WBC:    Lab Results   Component Value Date/Time    WBC 12.4 04/15/2025 08:30 AM     Hemoglobin/Hematocrit:    Lab Results   Component Value Date/Time    HGB 10.1 04/15/2025 08:30 AM    HCT 30.9 04/15/2025 08:30 AM     BMP:    Lab Results   Component Value Date/Time     04/15/2025 08:30 AM    K 4.3 04/15/2025 08:30 AM     04/15/2025 08:30 AM    CO2 24 04/15/2025 08:30 AM    BUN 33 04/15/2025 08:30 AM    CREATININE 1.3 04/15/2025 08:30 AM    CALCIUM 8.9 04/15/2025 08:30 AM    GFRAA 52 2016 06:22 AM    LABGLOM 39 04/15/2025 08:30 AM    LABGLOM 54 12/15/2022 01:26 PM     PT/INR:    Lab Results   Component Value Date/Time    PROTIME 14.0 12/15/2022 01:26 PM    INR 1.08 12/15/2022 01:26 PM     PTT:  No results found for: \"APTT\"[APTT      Impression/Plan: 89yo F POD1 exam under anesthesia, vaginoscopy, SPT placement.     -No  complaints today  -SPT draining yellow urine in catheter tubing  -Labs/vitals stable  -DC home today with FU to see us in 1 month for SPT exchange      LORIE Hernandez

## 2025-04-15 NOTE — CARE COORDINATION
Case Management Assessment  Initial Evaluation    Date/Time of Evaluation: 4/15/2025 11:22 AM  Assessment Completed by: Lilli Guthrie RN    If patient is discharged prior to next notation, then this note serves as note for discharge by case management.    Patient Name: Amrita Ramos                   YOB: 1934  Diagnosis: Overactive bladder [N32.81]                   Date / Time: 4/14/2025 11:07 AM    Patient Admission Status: Observation   Readmission Risk (Low < 19, Mod (19-27), High > 27): Readmission Risk Score: 9.3    Current PCP: Win Trejo MD  PCP verified by CM? Yes    Chart Reviewed: Yes      History Provided by: Patient  Patient Orientation: Alert and Oriented    Patient Cognition: Alert    Hospitalization in the last 30 days (Readmission):  No    If yes, Readmission Assessment in  Navigator will be completed.    Advance Directives:      Code Status: Full Code   Patient's Primary Decision Maker is: Legal Next of Kin      Discharge Planning:    Patient lives with: Children Type of Home: Apartment  Primary Care Giver: Self  Patient Support Systems include: Children, Family Members   Current Financial resources: Medicare  Current community resources: None  Current services prior to admission: Durable Medical Equipment            Current DME: [facility] ([facility])            Type of Home Care services:  None    ADLS  Prior functional level: Independent in ADLs/IADLs  Current functional level: Independent in ADLs/IADLs    PT AM-PAC:   /24  OT AM-PAC:   /24    Family can provide assistance at DC: Yes  Would you like Case Management to discuss the discharge plan with any other family members/significant others, and if so, who? No  Plans to Return to Present Housing: Yes  Other Identified Issues/Barriers to RETURNING to current housing: n/a  Potential Assistance needed at discharge: N/A            Potential DME:    Patient expects to discharge to: Apartment  Plan for transportation at

## 2025-04-15 NOTE — PROGRESS NOTES
Pt is refusing to ambulate, she said she need some sleep. She want to ambulate later once she fully awake.

## 2025-04-15 NOTE — DISCHARGE INSTRUCTIONS
Please call [phone] ([facility]) with any questions/concerns. It is normal to see blood in the urine for a few days. Please do not restart your aspirin until urine has been clear yellow for 48 hours. Please avoid heavy lifting/strenuous exercise for 2-3 weeks. We will see you in 1 month for post-op check.

## 2025-04-15 NOTE — PLAN OF CARE
Problem: Discharge Planning  Goal: Discharge to home or other facility with appropriate resources  4/15/2025 1118 by Radha Brooks, RN  Flowsheets (Taken 4/15/2025 1118)  Discharge to home or other facility with appropriate resources:   Arrange for needed discharge resources and transportation as appropriate   Identify barriers to discharge with patient and caregiver  4/14/2025 2354 by Geovanna Porter, RN  Outcome: Progressing  4/14/2025 2353 by Geovanna Porter, RN  Outcome: Progressing

## 2025-04-15 NOTE — PLAN OF CARE
Problem: Discharge Planning  Goal: Discharge to home or other facility with appropriate resources  4/14/2025 2354 by Geovanna Porter RN  Outcome: Progressing     Problem: Pain  Goal: Verbalizes/displays adequate comfort level or baseline comfort level  4/14/2025 2354 by Geovanna Porter RN  Outcome: Progressing  Flowsheets (Taken 4/14/2025 2354)  Verbalizes/displays adequate comfort level or baseline comfort level:   Encourage patient to monitor pain and request assistance   Assess pain using appropriate pain scale   Administer analgesics based on type and severity of pain and evaluate response   Implement non-pharmacological measures as appropriate and evaluate response   Consider cultural and social influences on pain and pain management   Notify Licensed Independent Practitioner if interventions unsuccessful or patient reports new pain     Problem: Safety - Adult  Goal: Free from fall injury  4/14/2025 2354 by Geovanna Porter RN  Outcome: Progressing  Flowsheets (Taken 4/14/2025 2354)  Free From Fall Injury:   Instruct family/caregiver on patient safety   Based on caregiver fall risk screen, instruct family/caregiver to ask for assistance with transferring infant if caregiver noted to have fall risk factors     Problem: ABCDS Injury Assessment  Goal: Absence of physical injury  Outcome: Progressing  Flowsheets (Taken 4/14/2025 2354)  Absence of Physical Injury: Implement safety measures based on patient assessment     Problem: Chronic Conditions and Co-morbidities  Goal: Patient's chronic conditions and co-morbidity symptoms are monitored and maintained or improved  Outcome: Progressing  Flowsheets (Taken 4/14/2025 2354)  Care Plan - Patient's Chronic Conditions and Co-Morbidity Symptoms are Monitored and Maintained or Improved:   Monitor and assess patient's chronic conditions and comorbid symptoms for stability, deterioration, or improvement   Collaborate with multidisciplinary team to address chronic

## 2025-04-15 NOTE — DISCHARGE SUMMARY
Urology Discharge Summary      Patient Identification  Amrita Ramos is a 89 y.o. female.  :  1934  Admit Date:  2025    Discharge date:   4/15/25                                  Disposition: home    Discharge Diagnoses:   Patient Active Problem List   Diagnosis    Atheroscler of native artery of both legs with intermit claudication    Bilateral carpal tunnel syndrome    Chronic pain disorder    Chronic rhinitis    CKD (chronic kidney disease) stage 3, GFR 30-59 ml/min (HCC)    Degenerative skin disorder    Dermatitis due to sun    Encounter for monitoring flecainide therapy    Essential hypertension    First degree AV block    Gastroesophageal reflux disease without esophagitis    Hyperlipidemia    Neuropathic pain    Normocytic anemia    OAB (overactive bladder)    Paroxysmal atrial fibrillation (HCC)    Perineal pain    Peripheral arterial disease    Proteinuria    Radiation cystitis    Radiation effect, sequela    Radiation necrosis of skin and subcutaneous    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     Activity:  no lifting or Strenuous exercise for 2-3 weeks    Condition on discharge: Stable    Follow-up: 1 month for SPT exchange    Hospital course: Patient was admitted to [facility] for procedure as stated above. Surgery went as planned with no complications. Patient was kept overnight for observation and she was discharged home in stable condition. She will see us in 1 month for SPT exchange.     LORIE Hernandez

## (undated) DEVICE — GUIDEWIRE URO L145CM DIA0.035IN TIP L7CM S STL PTFE BENT

## (undated) DEVICE — SOL IRR SOD CHL 0.9% TITAN XL CNTNR 3000ML

## (undated) DEVICE — SET VLV 3 PC AWS DISPOSABLE GRDIAN SCOPEVALET

## (undated) DEVICE — RX PUSHER: Brand: NAVIFLEX™ RX PUSHER

## (undated) DEVICE — BANDAGE,GAUZE,BULKEE II,4.5"X4.1YD,STRL: Brand: MEDLINE

## (undated) DEVICE — SOLUTION IV IRRIG WATER 500ML POUR BRL ST 2F7113

## (undated) DEVICE — Device: Brand: INJETAK ADJUSTABLE TIP NEEDLE 70CM

## (undated) DEVICE — CANNULATING SPHINCTEROTOME: Brand: TRUETOME DREAMWIRE 44

## (undated) DEVICE — GUIDEWIRE ENDOSCP L150CM DIA0.035IN TIP 3CM PTFE NIT

## (undated) DEVICE — GLOVE ORANGE PI 7   MSG9070

## (undated) DEVICE — CATHETER,URETHRAL,REDRUBBER,STRL,10FR: Brand: MEDLINE INDUSTRIES, INC.

## (undated) DEVICE — SOLUTION IRRG H2O 3000 ML USP STRL TITAN XL CONTAINER

## (undated) DEVICE — CATHETER IV 14GA L5.25IN PERIPH ORNG FEP POLYMER 3 BVL

## (undated) DEVICE — PROCEDURE KIT ENDOSCP CUST

## (undated) DEVICE — TERUMO NEEDLE: Brand: TERUMO

## (undated) DEVICE — SUTURE PERMA-HAND SZ 2-0 L30IN NONABSORBABLE BLK L30MM FSL 679H

## (undated) DEVICE — SYRINGE MED 20ML STD CLR PLAS LUERLOCK TIP N CTRL DISP

## (undated) DEVICE — CATHETER URETH 16FR BLLN 5CC STD LTX 2 W F TWO OPP DRNGE

## (undated) DEVICE — SYRINGE CATH TIP 50ML

## (undated) DEVICE — 1LYRTR 16FR10ML100%SIL UMS SNP: Brand: MEDLINE INDUSTRIES, INC.

## (undated) DEVICE — SYRINGE MED 30ML STD CLR PLAS LUERLOCK TIP N CTRL DISP

## (undated) DEVICE — GOWN AURORA NONREINF LG: Brand: MEDLINE INDUSTRIES, INC.

## (undated) DEVICE — CYSTO: Brand: MEDLINE INDUSTRIES, INC.

## (undated) DEVICE — BLADE,CARBON-STEEL,15,STRL,DISPOSABLE,TB: Brand: MEDLINE

## (undated) DEVICE — CATHETER URETH 20FR 30CC BLLN SIL ELASTMR F 2 W

## (undated) DEVICE — TOWEL,STOP FLAG GOLD N-W: Brand: MEDLINE

## (undated) DEVICE — OPEN-END FLEXI-TIP URETERAL CATHETER: Brand: FLEXI-TIP

## (undated) DEVICE — MOUTHPIECE ENDOSCP L CTRL OPN AND SIDE PORTS DISP

## (undated) DEVICE — RETRIEVAL BALLOON CATHETER: Brand: EXTRACTOR™ PRO RX

## (undated) DEVICE — SUTURE PDS + SZ 2 0 L27IN ABSRB VLT L26MM CT 2 1 2 CIR PDP333H

## (undated) DEVICE — BW-412T DISP COMBO CLEANING BRUSH: Brand: SINGLE USE COMBINATION CLEANING BRUSH

## (undated) DEVICE — WIREGUIDED RETRIEVAL BASKET: Brand: TRAPEZOID RX

## (undated) DEVICE — BAG DRAINAGE 2000ML UROLOGY ANTI REFLUX CHAMBER SAMPLE PORT

## (undated) DEVICE — GUIDEWIRE VASC L260CM DIA0.038IN L1CM TIP PTFE S STL SHT

## (undated) DEVICE — GLOVE ORANGE PI 7 1/2   MSG9075

## (undated) DEVICE — 60 ML SYRINGE,CATHETER TIP: Brand: MONOJECT

## (undated) DEVICE — DEVICE GRSP L230CM SHTH DIA2.4MM HYBRID JAW FLX DST WIRE

## (undated) DEVICE — H SOFT CLOTH SURGICAL TAPE, 2860-6, 6 IN X 10 YD (15,2 CM X 9,1 M), 6 ROLLS/CASE: Brand: 3M™ MEDIPORE™

## (undated) DEVICE — FORCEPS BX L240CM WRK CHN 2.8MM STD CAP W/ NDL MIC MESH

## (undated) DEVICE — ONE-STEP SUPRAPUBIC INTRODUCER: Brand: COOK

## (undated) DEVICE — SOLUTION IV 1000ML 0.9% SOD CHL

## (undated) DEVICE — TAPE DRSG 3INX10YD SILK HYPOALRG CURASILK

## (undated) DEVICE — NEEDLE SPNL L3.5IN PNK HUB S STL REG WALL FIT STYL W/ QNCKE